# Patient Record
Sex: MALE | Race: WHITE | Employment: FULL TIME | URBAN - METROPOLITAN AREA
[De-identification: names, ages, dates, MRNs, and addresses within clinical notes are randomized per-mention and may not be internally consistent; named-entity substitution may affect disease eponyms.]

---

## 2017-03-09 ENCOUNTER — HOSPITAL ENCOUNTER (OUTPATIENT)
Dept: SURGERY | Age: 72
Discharge: HOME | End: 2017-03-09

## 2017-03-09 VITALS
HEIGHT: 76 IN | BODY MASS INDEX: 22.53 KG/M2 | TEMPERATURE: 98.6 F | HEART RATE: 64 BPM | DIASTOLIC BLOOD PRESSURE: 63 MMHG | WEIGHT: 185 LBS | OXYGEN SATURATION: 98 % | RESPIRATION RATE: 16 BRPM | SYSTOLIC BLOOD PRESSURE: 104 MMHG

## 2017-03-09 RX ORDER — ASPIRIN 81 MG/1
81 TABLET ORAL
COMMUNITY

## 2017-03-09 RX ORDER — MONTELUKAST SODIUM 10 MG/1
10 TABLET ORAL
COMMUNITY

## 2017-03-09 RX ORDER — CHOLECALCIFEROL (VITAMIN D3) 125 MCG
CAPSULE ORAL
COMMUNITY

## 2017-03-09 RX ORDER — SILODOSIN 4 MG/1
4 CAPSULE ORAL
COMMUNITY

## 2017-03-09 RX ORDER — LANOLIN ALCOHOL/MO/W.PET/CERES
1000 CREAM (GRAM) TOPICAL DAILY
COMMUNITY

## 2017-03-09 RX ORDER — SILDENAFIL 100 MG/1
100 TABLET, FILM COATED ORAL AS NEEDED
COMMUNITY

## 2017-03-09 RX ORDER — BISMUTH SUBSALICYLATE 262 MG
1 TABLET,CHEWABLE ORAL DAILY
COMMUNITY

## 2017-03-09 RX ORDER — GLUCOSAM/CHONDRO/HERB 149/HYAL 750-100 MG
TABLET ORAL 2 TIMES DAILY
COMMUNITY

## 2017-03-09 RX ORDER — ATORVASTATIN CALCIUM 40 MG/1
40 TABLET, FILM COATED ORAL
COMMUNITY

## 2017-03-09 RX ORDER — FINASTERIDE 5 MG/1
5 TABLET, FILM COATED ORAL
COMMUNITY

## 2017-03-09 RX ORDER — AZELASTINE HCL 205.5 UG/1
2 SPRAY NASAL
COMMUNITY

## 2017-03-09 NOTE — PERIOP NOTES
Patient verified name, , and surgery as listed in [facility]. TYPE  CASE:1B  Orders per surgeon: were Received  Labs per surgeon:none  Labs per anesthesia protocol: none  EKG  :  From 2017 on chart per protocol, patient has hx of paroxysmal A Fib which is controlled by Multaq, and asa 81 mg      Patient provided with handouts including guide to surgery , transfusions, pain management and hand hygiene for the family and community. Pt verbalizes understanding of all pre-op instructions . Instructed that family must be present in building at all times. Hibiclens and instructions given per hospital policy. Instructed patient to continue  previous medications as prescribed prior to surgery and  to take the following medications the day of surgery according to anesthesia guidelines : asa 81 mg, atorvastatin, multaq, finasteride       Original medication prescription bottles were not visualized during patient appointment. Continue all previous medications unless otherwise directed. Instructed patient to hold  the following medications prior to surgery: vitamins      Patient verbalized understanding of all instructions and provided all medical/health information to the best of their ability.

## 2017-03-14 ENCOUNTER — ANESTHESIA EVENT (OUTPATIENT)
Dept: SURGERY | Age: 72
End: 2017-03-14
Payer: COMMERCIAL

## 2017-03-14 RX ORDER — MIDAZOLAM HYDROCHLORIDE 1 MG/ML
2 INJECTION, SOLUTION INTRAMUSCULAR; INTRAVENOUS
Status: CANCELLED | OUTPATIENT
Start: 2017-03-14

## 2017-03-14 RX ORDER — SODIUM CHLORIDE 9 MG/ML
50 INJECTION, SOLUTION INTRAVENOUS CONTINUOUS
Status: CANCELLED | OUTPATIENT
Start: 2017-03-14 | End: 2017-03-15

## 2017-03-15 ENCOUNTER — ANESTHESIA (OUTPATIENT)
Dept: SURGERY | Age: 72
End: 2017-03-15
Payer: COMMERCIAL

## 2017-03-15 ENCOUNTER — SURGERY (OUTPATIENT)
Age: 72
End: 2017-03-15

## 2017-03-15 ENCOUNTER — HOSPITAL ENCOUNTER (OUTPATIENT)
Age: 72
Setting detail: OUTPATIENT SURGERY
Discharge: HOME | End: 2017-03-15
Attending: ORTHOPAEDIC SURGERY | Admitting: ORTHOPAEDIC SURGERY
Payer: COMMERCIAL

## 2017-03-15 VITALS
OXYGEN SATURATION: 99 % | SYSTOLIC BLOOD PRESSURE: 102 MMHG | HEART RATE: 64 BPM | DIASTOLIC BLOOD PRESSURE: 59 MMHG | WEIGHT: 183.5 LBS | TEMPERATURE: 97.4 F | RESPIRATION RATE: 16 BRPM | BODY MASS INDEX: 22.34 KG/M2

## 2017-03-15 PROCEDURE — 74011250637 HC RX REV CODE- 250/637: Performed by: ANESTHESIOLOGY

## 2017-03-15 PROCEDURE — 74011250636 HC RX REV CODE- 250/636: Performed by: ANESTHESIOLOGY

## 2017-03-15 PROCEDURE — 77030002933 HC SUT MCRYL J&J -A: Performed by: ORTHOPAEDIC SURGERY

## 2017-03-15 PROCEDURE — 74011250636 HC RX REV CODE- 250/636: Performed by: ORTHOPAEDIC SURGERY

## 2017-03-15 PROCEDURE — 77030003666 HC NDL SPINAL BD -A: Performed by: ORTHOPAEDIC SURGERY

## 2017-03-15 PROCEDURE — 77030003602 HC NDL NRV BLK BBMI -B: Performed by: ANESTHESIOLOGY

## 2017-03-15 PROCEDURE — 76060000033 HC ANESTHESIA 1 TO 1.5 HR: Performed by: ORTHOPAEDIC SURGERY

## 2017-03-15 PROCEDURE — 77030006668 HC BLD SHV MENSCS STRY -B: Performed by: ORTHOPAEDIC SURGERY

## 2017-03-15 PROCEDURE — C1713 ANCHOR/SCREW BN/BN,TIS/BN: HCPCS | Performed by: ORTHOPAEDIC SURGERY

## 2017-03-15 PROCEDURE — 77030033005 HC TBNG ARTHSC PMP STRY -B: Performed by: ORTHOPAEDIC SURGERY

## 2017-03-15 PROCEDURE — 77030010430: Performed by: ORTHOPAEDIC SURGERY

## 2017-03-15 PROCEDURE — 74011000250 HC RX REV CODE- 250

## 2017-03-15 PROCEDURE — 76010010054 HC POST OP PAIN BLOCK: Performed by: ORTHOPAEDIC SURGERY

## 2017-03-15 PROCEDURE — 77030033073 HC TBNG ARTHSC PMP OUTFLO STRY -B: Performed by: ORTHOPAEDIC SURGERY

## 2017-03-15 PROCEDURE — 76210000026 HC REC RM PH II 1 TO 1.5 HR: Performed by: ORTHOPAEDIC SURGERY

## 2017-03-15 PROCEDURE — 77030004453 HC BUR SHV STRY -B: Performed by: ORTHOPAEDIC SURGERY

## 2017-03-15 PROCEDURE — 76210000063 HC OR PH I REC FIRST 0.5 HR: Performed by: ORTHOPAEDIC SURGERY

## 2017-03-15 PROCEDURE — 77030019605: Performed by: ORTHOPAEDIC SURGERY

## 2017-03-15 PROCEDURE — 76010000161 HC OR TIME 1 TO 1.5 HR INTENSV-TIER 1: Performed by: ORTHOPAEDIC SURGERY

## 2017-03-15 PROCEDURE — 77030020052 HC SUT PASS DISP S&N -B: Performed by: ORTHOPAEDIC SURGERY

## 2017-03-15 PROCEDURE — 76942 ECHO GUIDE FOR BIOPSY: CPT | Performed by: ORTHOPAEDIC SURGERY

## 2017-03-15 PROCEDURE — 77030018673: Performed by: ORTHOPAEDIC SURGERY

## 2017-03-15 PROCEDURE — 77030027384 HC PRB ARTHSCP SERFAS STRY -C: Performed by: ORTHOPAEDIC SURGERY

## 2017-03-15 PROCEDURE — 77030034139 HC NDL ENDOSC TRUPASS SGL S&N -C: Performed by: ORTHOPAEDIC SURGERY

## 2017-03-15 PROCEDURE — 77030018836 HC SOL IRR NACL ICUM -A: Performed by: ORTHOPAEDIC SURGERY

## 2017-03-15 PROCEDURE — 74011250636 HC RX REV CODE- 250/636

## 2017-03-15 PROCEDURE — 77030020143 HC AIRWY LARYN INTUB CGAS -A: Performed by: ANESTHESIOLOGY

## 2017-03-15 PROCEDURE — 77030032490 HC SLV COMPR SCD KNE COVD -B: Performed by: ORTHOPAEDIC SURGERY

## 2017-03-15 DEVICE — FOOTPRINT ULTRA PK SUTURE ANCHOR 4.5
Type: IMPLANTABLE DEVICE | Site: SHOULDER | Status: FUNCTIONAL
Brand: FOOTPRINT

## 2017-03-15 RX ORDER — MIDAZOLAM HYDROCHLORIDE 1 MG/ML
2 INJECTION, SOLUTION INTRAMUSCULAR; INTRAVENOUS ONCE
Status: COMPLETED | OUTPATIENT
Start: 2017-03-15 | End: 2017-03-15

## 2017-03-15 RX ORDER — SODIUM CHLORIDE 0.9 % (FLUSH) 0.9 %
5-10 SYRINGE (ML) INJECTION EVERY 8 HOURS
Status: DISCONTINUED | OUTPATIENT
Start: 2017-03-15 | End: 2017-03-15 | Stop reason: HOSPADM

## 2017-03-15 RX ORDER — OXYCODONE HYDROCHLORIDE 5 MG/1
5 TABLET ORAL
Status: DISCONTINUED | OUTPATIENT
Start: 2017-03-15 | End: 2017-03-15 | Stop reason: HOSPADM

## 2017-03-15 RX ORDER — LIDOCAINE HYDROCHLORIDE 10 MG/ML
0.1 INJECTION INFILTRATION; PERINEURAL AS NEEDED
Status: DISCONTINUED | OUTPATIENT
Start: 2017-03-15 | End: 2017-03-15 | Stop reason: HOSPADM

## 2017-03-15 RX ORDER — DIPHENHYDRAMINE HYDROCHLORIDE 50 MG/ML
12.5 INJECTION, SOLUTION INTRAMUSCULAR; INTRAVENOUS ONCE
Status: DISCONTINUED | OUTPATIENT
Start: 2017-03-15 | End: 2017-03-15 | Stop reason: HOSPADM

## 2017-03-15 RX ORDER — CEFAZOLIN SODIUM IN 0.9 % NACL 2 G/50 ML
2 INTRAVENOUS SOLUTION, PIGGYBACK (ML) INTRAVENOUS ONCE
Status: COMPLETED | OUTPATIENT
Start: 2017-03-15 | End: 2017-03-15

## 2017-03-15 RX ORDER — PROPOFOL 10 MG/ML
INJECTION, EMULSION INTRAVENOUS AS NEEDED
Status: DISCONTINUED | OUTPATIENT
Start: 2017-03-15 | End: 2017-03-15 | Stop reason: HOSPADM

## 2017-03-15 RX ORDER — EPINEPHRINE 1 MG/ML
INJECTION, SOLUTION, CONCENTRATE INTRAVENOUS AS NEEDED
Status: DISCONTINUED | OUTPATIENT
Start: 2017-03-15 | End: 2017-03-15 | Stop reason: HOSPADM

## 2017-03-15 RX ORDER — HYDROMORPHONE HYDROCHLORIDE 2 MG/ML
0.5 INJECTION, SOLUTION INTRAMUSCULAR; INTRAVENOUS; SUBCUTANEOUS
Status: DISCONTINUED | OUTPATIENT
Start: 2017-03-15 | End: 2017-03-15 | Stop reason: HOSPADM

## 2017-03-15 RX ORDER — SODIUM CHLORIDE, SODIUM LACTATE, POTASSIUM CHLORIDE, CALCIUM CHLORIDE 600; 310; 30; 20 MG/100ML; MG/100ML; MG/100ML; MG/100ML
100 INJECTION, SOLUTION INTRAVENOUS CONTINUOUS
Status: DISCONTINUED | OUTPATIENT
Start: 2017-03-15 | End: 2017-03-15 | Stop reason: HOSPADM

## 2017-03-15 RX ORDER — FENTANYL CITRATE 50 UG/ML
25 INJECTION, SOLUTION INTRAMUSCULAR; INTRAVENOUS ONCE
Status: COMPLETED | OUTPATIENT
Start: 2017-03-15 | End: 2017-03-15

## 2017-03-15 RX ORDER — SODIUM CHLORIDE 0.9 % (FLUSH) 0.9 %
5-10 SYRINGE (ML) INJECTION AS NEEDED
Status: DISCONTINUED | OUTPATIENT
Start: 2017-03-15 | End: 2017-03-15 | Stop reason: HOSPADM

## 2017-03-15 RX ORDER — ROPIVACAINE HYDROCHLORIDE 5 MG/ML
INJECTION, SOLUTION EPIDURAL; INFILTRATION; PERINEURAL AS NEEDED
Status: DISCONTINUED | OUTPATIENT
Start: 2017-03-15 | End: 2017-03-15 | Stop reason: HOSPADM

## 2017-03-15 RX ORDER — FAMOTIDINE 20 MG/1
20 TABLET, FILM COATED ORAL ONCE
Status: COMPLETED | OUTPATIENT
Start: 2017-03-15 | End: 2017-03-15

## 2017-03-15 RX ORDER — DEXAMETHASONE SODIUM PHOSPHATE 4 MG/ML
INJECTION, SOLUTION INTRA-ARTICULAR; INTRALESIONAL; INTRAMUSCULAR; INTRAVENOUS; SOFT TISSUE AS NEEDED
Status: DISCONTINUED | OUTPATIENT
Start: 2017-03-15 | End: 2017-03-15 | Stop reason: HOSPADM

## 2017-03-15 RX ORDER — LIDOCAINE HYDROCHLORIDE 20 MG/ML
INJECTION, SOLUTION EPIDURAL; INFILTRATION; INTRACAUDAL; PERINEURAL AS NEEDED
Status: DISCONTINUED | OUTPATIENT
Start: 2017-03-15 | End: 2017-03-15 | Stop reason: HOSPADM

## 2017-03-15 RX ORDER — OXYCODONE AND ACETAMINOPHEN 5; 325 MG/1; MG/1
1 TABLET ORAL AS NEEDED
Status: DISCONTINUED | OUTPATIENT
Start: 2017-03-15 | End: 2017-03-15 | Stop reason: HOSPADM

## 2017-03-15 RX ADMIN — SODIUM CHLORIDE, SODIUM LACTATE, POTASSIUM CHLORIDE, AND CALCIUM CHLORIDE: 600; 310; 30; 20 INJECTION, SOLUTION INTRAVENOUS at 10:35

## 2017-03-15 RX ADMIN — CEFAZOLIN 2 G: 1 INJECTION, POWDER, FOR SOLUTION INTRAMUSCULAR; INTRAVENOUS; PARENTERAL at 10:02

## 2017-03-15 RX ADMIN — FENTANYL CITRATE 25 MCG: 50 INJECTION, SOLUTION INTRAMUSCULAR; INTRAVENOUS at 09:23

## 2017-03-15 RX ADMIN — LIDOCAINE HYDROCHLORIDE 100 MG: 20 INJECTION, SOLUTION EPIDURAL; INFILTRATION; INTRACAUDAL; PERINEURAL at 10:04

## 2017-03-15 RX ADMIN — FAMOTIDINE 20 MG: 20 TABLET ORAL at 09:14

## 2017-03-15 RX ADMIN — SODIUM CHLORIDE, SODIUM LACTATE, POTASSIUM CHLORIDE, AND CALCIUM CHLORIDE 100 ML/HR: 600; 310; 30; 20 INJECTION, SOLUTION INTRAVENOUS at 09:15

## 2017-03-15 RX ADMIN — PROPOFOL 30 MG: 10 INJECTION, EMULSION INTRAVENOUS at 09:23

## 2017-03-15 RX ADMIN — MIDAZOLAM HYDROCHLORIDE 2 MG: 1 INJECTION, SOLUTION INTRAMUSCULAR; INTRAVENOUS at 09:23

## 2017-03-15 RX ADMIN — PROPOFOL 200 MG: 10 INJECTION, EMULSION INTRAVENOUS at 10:04

## 2017-03-15 RX ADMIN — DEXAMETHASONE SODIUM PHOSPHATE 4 MG: 4 INJECTION, SOLUTION INTRA-ARTICULAR; INTRALESIONAL; INTRAMUSCULAR; INTRAVENOUS; SOFT TISSUE at 10:15

## 2017-03-15 RX ADMIN — ROPIVACAINE HYDROCHLORIDE 30 ML: 5 INJECTION, SOLUTION EPIDURAL; INFILTRATION; PERINEURAL at 09:27

## 2017-03-15 RX ADMIN — EPINEPHRINE 1 MG: 1 INJECTION, SOLUTION INTRAMUSCULAR; SUBCUTANEOUS at 10:35

## 2017-03-15 NOTE — BRIEF OP NOTE
BRIEF OPERATIVE NOTE    Date of Procedure: 3/15/2017   Preoperative Diagnosis: Tear of left rotator cuff, unspecified tear extent [M75.102]  Postoperative Diagnosis: Left shoulder rotator cuff tear/impingement/AC arthritis    Procedure(s):  LEFT SHOULDER ARTHROSCOPY ROTATOR CUFF REPAIR/ DECOMPRESSION/ DISTAL CLAVICLE EXCISION  Surgeon(s) and Role: Davon Lopez MD - Primary       Physician Assistant: EDILMA Rose    Surgical Staff:  Circ-1: Otis Laguerre RN  Physician Assistant: EDILMA Rose  Scrub Tech-1: Willy Davidson  Scrub Tech-2: Anshul Mayesretta  Event Time In   Incision Start 1023   Incision Close 1101     Anesthesia: General   Estimated Blood Loss: min  Specimens: * No specimens in log *   Findings: RTC   Complications: none  Implants:   Implant Name Type Inv.  Item Serial No.  Lot No. LRB No. Used Action   ANCHOR FOOTPRINT SUT 4.5MM --  - [id number]   ANCHOR FOOTPRINT SUT 4.5MM --    [facility] AND [facility] ENDOSCOPY [id number] 1 Implanted

## 2017-03-15 NOTE — DISCHARGE INSTRUCTIONS
. Post-Operative Instructions   For  Shoulder Arthroscopy Rotator Cuff Repair  Phone:  [phone]    1. If you do not have an \"Iceman\" type cooling unit, for the first 48-72 hours following surgery, use ice on the shoulder every two hours (while awake) for 20-30 minutes at a time to help prevent swelling and lessen pain. If you have a cooling unit, follow the instructions given to you- continually as much as possible the first 48-72 hours, then 3-4 times a day for 4 weeks. 2. You may shower after the arthroscopy. Keep dressings in place for the first three days. After three days, you may remove the dressings and leave the steri-strip bandages in place; they will peel off naturally. 3. Stay in sling at all times except to shower. Do not use shoulder muscles by raising arm toward head or away from side of body at any time. 4. Begin therapy as ordered. The therapists will call you to schedule     5. Use any pain medication as instructed. You should take your pain medication as soon as you feel the anesthetic wearing off. Do not wait until you are in severe pain to begin taking your pain medication. 6. You may have some side effects from your pain medication. If you have nausea, try taking your medication with food. For itching, you may take over the counter Benadryl. 7. You may have been given a prescription for Zofran or Phenergan. This medication is used for nausea and vomiting. You do not need to get this prescription filled unless you have a problem. 8. If you have a problem, please call [address] [place] [address] at [phone]    [address] [place] [address], P.A[facility]   ·     DIET  · Clear liquids until no nausea or vomiting; then light diet for the first day. · Advance to regular diet on second day, unless your doctor orders otherwise. · If nausea and vomiting continues, call your doctor.    · Avoid greasy and spicy food today to reduce nausea. PAIN  · Take pain medication as directed by your doctor. · Call your doctor if pain is NOT relieved by medication. · DO NOT take aspirin of blood thinners unless directed by your doctor. · Take pain pills with food to reduce nausea  · Pain pills may cause constipation. May use stool softener  · Begin taking pain pills when sensation nreturns or at bedtime even if still numb          CALL YOUR DOCTOR IF   · Excessive bleeding that does not stop after holding pressure over the area  · Temperature of 101 degrees F or above  · Excessive redness, swelling or bruising, and/ or green or yellow, smelly discharge from incision    AFTER ANESTHESIA   · For the first 24 hours: DO NOT Drive, Drink alcoholic beverages, or Make important decisions. · Be aware of dizziness following anesthesia and while taking pain medication. APPOINTMENT DATE/ TIME: The office will call you to schedule a 2 week follow up. YOUR DOCTOR'S PHONE NUMBER [phone]      DISCHARGE SUMMARY from Nurse    PATIENT INSTRUCTIONS:    After general anesthesia or intravenous sedation, for 24 hours or while taking prescription Narcotics:  · Limit your activities  · Do not drive and operate hazardous machinery  · Do not make important personal or business decisions  · Do  not drink alcoholic beverages  · If you have not urinated within 8 hours after discharge, please contact your surgeon on call. *  Please give a list of your current medications to your Primary Care Provider. *  Please update this list whenever your medications are discontinued, doses are      changed, or new medications (including over-the-counter products) are added. *  Please carry medication information at all times in case of emergency situations.       These are general instructions for a healthy lifestyle:    No smoking/ No tobacco products/ Avoid exposure to second hand smoke    Surgeon General's Warning:  Quitting smoking now greatly reduces serious risk to your health. Obesity, smoking, and sedentary lifestyle greatly increases your risk for illness    A healthy diet, regular physical exercise & weight monitoring are important for maintaining a healthy lifestyle    You may be retaining fluid if you have a history of heart failure or if you experience any of the following symptoms:  Weight gain of 3 pounds or more overnight or 5 pounds in a week, increased swelling in our hands or feet or shortness of breath while lying flat in bed. Please call your doctor as soon as you notice any of these symptoms; do not wait until your next office visit.     Recognize signs and symptoms of STROKE:    F-face looks uneven    A-arms unable to move or move unevenly    S-speech slurred or non-existent    T-time-call 911 as soon as signs and symptoms begin-DO NOT go       Back to bed or wait to see if you get better-TIME IS BRAIN

## 2017-03-15 NOTE — IP AVS SNAPSHOT
Tessa Melton 
 
 
 [address] 
[phone] Patient: Lauralyn Schwab MRN: [id number] AYZ:[birth year] You are allergic to the following No active allergies Recent Documentation Weight BMI Smoking Status 83.2 kg 22.34 kg/m2 Never Smoker Emergency Contacts Name Discharge Info Relation Home Work Mobile Corazon Christie DISCHARGE CAREGIVER [3] Spouse [3] [phone] About your hospitalization You were admitted on:  March 15, 2017 You last received care in the:  [facility] OP PACU You were discharged on:  March 15, 2017 Unit phone number:  [phone] Why you were hospitalized Your primary diagnosis was:  Not on File Providers Seen During Your Hospitalizations Provider Role Specialty Primary office phone Maggy Sweeney MD Attending Provider Orthopedic Surgery [phone] Your Primary Care Physician (PCP) Primary Care Physician Office Phone Office Fax Rivka Crespo [phone] Follow-up Information None Your Appointments Wednesday March 22, 2017  1:00 PM EDT  
[facility] INITIAL ORTHO with David Gao, PT  
[facility] ([address] [place] [address]) [address] [place] [id number] [phone] Current Discharge Medication List  
  
ASK your doctor about these medications Dose & Instructions Dispensing Information Comments Morning Noon Evening Bedtime  
 aspirin delayed-release 81 mg tablet Your last dose was: Your next dose is:    
   
   
 Dose:  81 mg Take 81 mg by mouth every morning. Refills:  0 Azelastine 0.15 % (205.5 mcg) nasal spray Commonly known as:  ASTEPRO Your last dose was: Your next dose is:    
   
   
 Dose:  2 Spray 2 Sprays by Both Nostrils route DIALYSIS PRN. Refills:  0  
     
   
   
   
  
 cyanocobalamin 1,000 mcg tablet Your last dose was: Your next dose is:    
   
   
 Dose:  1000 mcg Take 1,000 mcg by mouth daily. Refills:  0  
     
   
   
   
  
 finasteride 5 mg tablet Commonly known as:  PROSCAR Your last dose was: Your next dose is:    
   
   
 Dose:  5 mg Take 5 mg by mouth every morning. Refills:  0  
     
   
   
   
  
 FISH OIL 1,000 mg (120 mg-180 mg) Cap Generic drug:  Omega-3-DHA-EPA-Fish Oil Your last dose was: Your next dose is: Take  by mouth two (2) times a day. Holding for suregey Refills:  0 LIPITOR 40 mg tablet Generic drug:  atorvastatin Your last dose was: Your next dose is:    
   
   
 Dose:  40 mg Take 40 mg by mouth every morning. Refills:  0 MULTAQ Tab tablet Generic drug:  dronedarone Your last dose was: Your next dose is:    
   
   
 Dose:  400 mg Take 400 mg by mouth two (2) times daily (with meals). Indications: PAROXYSMAL ATRIAL FIBRILLATION Refills:  0  
     
   
   
   
  
 multivitamin tablet Commonly known as:  ONE A DAY Your last dose was: Your next dose is:    
   
   
 Dose:  1 Tab Take 1 Tab by mouth daily. Holding till surgery Refills:  0  
     
   
   
   
  
 RAPAFLO 4 mg capsule Generic drug:  silodosin Your last dose was: Your next dose is:    
   
   
 Dose:  4 mg Take 4 mg by mouth nightly. Refills:  0 SINGULAIR 10 mg tablet Generic drug:  montelukast  
   
Your last dose was: Your next dose is:    
   
   
 Dose:  10 mg Take 10 mg by mouth nightly. Refills:  0 VIAGRA 100 mg tablet Generic drug:  sildenafil citrate Your last dose was: Your next dose is:    
   
   
 Dose:  100 mg Take 100 mg by mouth as needed. Refills:  0 VITAMIN D3 2,000 unit Tab Generic drug:  cholecalciferol (vitamin D3) Your last dose was: Your next dose is: Take  by mouth. Holding for surgery Refills:  0 Discharge Instructions Enrique Aponte Post-Operative Instructions For Shoulder Arthroscopy Rotator Cuff Repair Phone:  [phone] 1. If you do not have an \"Iceman\" type cooling unit, for the first 48-72 hours following surgery, use ice on the shoulder every two hours (while awake) for 20-30 minutes at a time to help prevent swelling and lessen pain. If you have a cooling unit, follow the instructions given to you- continually as much as possible the first 48-72 hours, then 3-4 times a day for 4 weeks. 2. You may shower after the arthroscopy. Keep dressings in place for the first three days. After three days, you may remove the dressings and leave the steri-strip bandages in place; they will peel off naturally. 3. Stay in sling at all times except to shower. Do not use shoulder muscles by raising arm toward head or away from side of body at any time. 4. Begin therapy as ordered. The therapists will call you to schedule 5. Use any pain medication as instructed. You should take your pain medication as soon as you feel the anesthetic wearing off. Do not wait until you are in severe pain to begin taking your pain medication. 6. You may have some side effects from your pain medication. If you have nausea, try taking your medication with food. For itching, you may take over the counter Benadryl. 7. You may have been given a prescription for Zofran or Phenergan. This medication is used for nausea and vomiting. You do not need to get this prescription filled unless you have a problem. 8. If you have a problem, please call [address] [place] [address] at [phone] [employer]  
·  
 
DIET · Clear liquids until no nausea or vomiting; then light diet for the first day. · Advance to regular diet on second day, unless your doctor orders otherwise. · If nausea and vomiting continues, call your doctor. · Avoid greasy and spicy food today to reduce nausea. PAIN 
· Take pain medication as directed by your doctor. · Call your doctor if pain is NOT relieved by medication. · DO NOT take aspirin of blood thinners unless directed by your doctor. · Take pain pills with food to reduce nausea · Pain pills may cause constipation. May use stool softener · Begin taking pain pills when sensation nreturns or at bedtime even if still numb CALL YOUR DOCTOR IF  
· Excessive bleeding that does not stop after holding pressure over the area · Temperature of 101 degrees F or above · Excessive redness, swelling or bruising, and/ or green or yellow, smelly discharge from incision AFTER ANESTHESIA · For the first 24 hours: DO NOT Drive, Drink alcoholic beverages, or Make important decisions. · Be aware of dizziness following anesthesia and while taking pain medication. APPOINTMENT DATE/ TIME: The office will call you to schedule a 2 week follow up. YOUR DOCTOR'S PHONE NUMBER [phone] DISCHARGE SUMMARY from Nurse PATIENT INSTRUCTIONS: 
 
After general anesthesia or intravenous sedation, for 24 hours or while taking prescription Narcotics: · Limit your activities · Do not drive and operate hazardous machinery · Do not make important personal or business decisions · Do  not drink alcoholic beverages · If you have not urinated within 8 hours after discharge, please contact your surgeon on call. *  Please give a list of your current medications to your Primary Care Provider. *  Please update this list whenever your medications are discontinued, doses are 
    changed, or new medications (including over-the-counter products) are added. *  Please carry medication information at all times in case of emergency situations. These are general instructions for a healthy lifestyle: No smoking/ No tobacco products/ Avoid exposure to second hand smoke Surgeon General's Warning:  Quitting smoking now greatly reduces serious risk to your health. Obesity, smoking, and sedentary lifestyle greatly increases your risk for illness A healthy diet, regular physical exercise & weight monitoring are important for maintaining a healthy lifestyle You may be retaining fluid if you have a history of heart failure or if you experience any of the following symptoms:  Weight gain of 3 pounds or more overnight or 5 pounds in a week, increased swelling in our hands or feet or shortness of breath while lying flat in bed. Please call your doctor as soon as you notice any of these symptoms; do not wait until your next office visit. Recognize signs and symptoms of STROKE: 
 
F-face looks uneven A-arms unable to move or move unevenly S-speech slurred or non-existent T-time-call 911 as soon as signs and symptoms begin-DO NOT go Back to bed or wait to see if you get better-TIME IS BRAIN Discharge Orders None Introducing [facility]! [facility] introduces [employer] patient portal. Now you can access parts of your medical record, email your doctor's office, and request medication refills online. 1. In your internet browser, go to [email or web address] 2. Click on the First Time User? Click Here link in the Sign In box. You will see the New Member Sign Up page. 3. Enter your [employer] Access Code exactly as it appears below. You will not need to use this code after youve completed the sign-up process. If you do not sign up before the expiration date, you must request a new code. · [employer] Access Code: [email or web address] Expires: 6/4/2017  3:51 PM 
 
 4. Enter the last four digits of your Social Security Number (xxxx) and Date of Birth (mm/dd/yyyy) as indicated and click Submit. You will be taken to the next sign-up page. 5. Create a [email or web address] ID. This will be your [email or web address] login ID and cannot be changed, so think of one that is secure and easy to remember. 6. Create a [email or web address] password. You can change your password at any time. 7. Enter your Password Reset Question and Answer. This can be used at a later time if you forget your password. 8. Enter your e-mail address. You will receive e-mail notification when new information is available in [address] 9. Click Sign Up. You can now view and download portions of your medical record. 10. Click the Download Summary menu link to download a portable copy of your medical information. If you have questions, please visit the Frequently Asked Questions section of the [email or web address] website. Remember, [email or web address] is NOT to be used for urgent needs. For medical emergencies, dial 911. Now available from your iPhone and Android! General Information Please provide this summary of care documentation to your next provider. Patient Signature:  ____________________________________________________________ Date:  ____________________________________________________________  
  
[employer] Provider Signature:  ____________________________________________________________ Date:  ____________________________________________________________

## 2017-03-15 NOTE — H&P
Outpatient Surgery History and Physical:  Jose Alvarez was seen and examined. CHIEF COMPLAINT:   Left shoulder pain. PE:     Visit Vitals    /71 (BP 1 Location: Right arm, BP Patient Position: At rest)    Pulse 65    Temp 97.9 °F (36.6 °C)    Resp 16    Wt 83.2 kg (183 lb 8 oz)    SpO2 99%    BMI 22.34 kg/m2       Heart:   Regular rhythm      Lungs:  Are clear      Past Medical History: There are no active problems to display for this patient. Surgical History:   Past Surgical History:   Procedure Laterality Date    HX CERVICAL DISKECTOMY  05/2000    HX COLONOSCOPY      HX UROLOGICAL  1979    vericocele ligation    HX VASECTOMY  1970's       Social History: Patient  reports that he has never smoked. He has never used smokeless tobacco. He reports that he drinks about 4.2 oz of alcohol per week  He reports that he does not use illicit drugs. Family History:   Family History   Problem Relation Age of Onset    Hypertension Mother     Hypertension Father        Allergies: Reviewed per EMR  No Known Allergies    Medications:    No current facility-administered medications on file prior to encounter. No current outpatient prescriptions on file prior to encounter. The surgery is planned for the left shoulder. History and physical has been reviewed. The patient has been examined. There have been no significant clinical changes since the completion of the originally dated History and Physical.  Patient identified by surgeon; surgical site was confirmed by patient and surgeon. The patient is here today for outpatient surgery. I have examined the patient, no changes are noted in the patient's medical status. Necessity for the procedure/care is still present and the history and physical above is current. See the office notes for the full long term history of the problem. Please see the recent office notes for the musculoskeletal examination.     Signed By: Tami Yanes Ty [place], [place]     March 15, 2017 1318

## 2017-03-15 NOTE — ANESTHESIA PROCEDURE NOTES
Peripheral Block    Start time: 3/15/2017 9:23 AM  End time: 3/15/2017 9:27 AM  Performed by: Nilda Alfaro  Authorized by: Nilda Alfaro       Pre-procedure: Indications: at surgeon's request and post-op pain management    Preanesthetic Checklist: patient identified, risks and benefits discussed, site marked, timeout performed, anesthesia consent given and patient being monitored    Timeout Time: 09:23          Block Type:   Block Type:   Interscalene  Laterality:  Left  Monitoring:  Standard ASA monitoring, continuous pulse ox, frequent vital sign checks, heart rate and oxygen  Injection Technique:  Single shot  Procedures: ultrasound guided and nerve stimulator    Prep: chlorhexidine    Location:  Interscalene  Needle Type:  Stimuplex  Needle Gauge:  22 G  Needle Localization:  Nerve stimulator and ultrasound guidance  Motor Response: minimal motor response >0.4 mA    Volume (mL):  40  Add'l Medication Injected:  0.5%  ropivacaine  Volume (mL):  10    Assessment:  Number of attempts:  1  Injection Assessment:  Local visualized surrounding nerve on ultrasound, negative aspiration for CSF, negative aspiration for blood, no paresthesia, no intravascular symptoms and ultrasound image on chart  Patient tolerance:  Patient tolerated the procedure well with no immediate complications  30

## 2017-03-15 NOTE — OP NOTES
[facility] 64   OPERATIVE REPORT       Name:  Chayo Cano   MR#:  [id number]   :  1945   Account #:  [de-identified]   Date of Adm:  03/15/2017       DATE OF SURGERY: 03/15/2017     PREOPERATIVE DIAGNOSES:   1. Rotator cuff tear. 2. Acromioclavicular arthritis. 3. Labral tear. 4. Impingement, left shoulder. POSTOPERATIVE DIAGNOSES:   1. Rotator cuff tear. 2. Acromioclavicular arthritis. 3. Labral tear. 4. Impingement, left shoulder. OPERATION PERFORMED:   1. Arthroscopic rotator cuff repair. 2. Arthroscopic distal clavicle excision (Benoit procedure). 3. Arthroscopic labral debridement. 4. Arthroscopic subacromial decompression, left shoulder. SURGEON: Barbie Dumont MD    ASSISTANT: EDILMA Castro    ANESTHESIA: General.    FLUIDS: Crystalloid. ESTIMATED BLOOD LOSS: Minimal.    FINDINGS: There was a tear of the rotator cuff. This involved   the supraspinatus. There was some mild partial tearing of   the anterior aspect of the infraspinatus. There was some tearing   of the upper border of the subscap in a small area, but the   majority of the subscap was intact. The biceps showed no   tearing. There was some superior, posterior, and inferior labral   tearing. There was no significant glenohumeral arthritis. There   was an anterior/inferior acromial slope. There was undersurface   osteophyte formation and degenerative change of the distal   clavicle at the [facility] joint. DESCRIPTION OF PROCEDURE: After informed consent, the patient   was brought to the operating room and placed on the table in   supine position. General endotracheal anesthesia was   administered without difficulty. The patient was placed in the   right lateral decubitus position. All pressure points were   suspended and padded appropriately. Left upper extremity   suspended in overhead traction. Left shoulder was prepped and   draped in sterile fashion.  Glenohumeral joint was insufflated   with 50 mL of sterile saline and a posterior portal was   established. Glenohumeral joint was then arthroscoped in   sequential manner. The aforementioned findings were noted. An   anterior portal was established. Labral tearing was debrided   smooth. All loose flaps of tissue were removed. There were no   sharp edges or unstable fragments after the labral debridement. Undersurface rotator cuff tearing was debrided back to a smooth,   stable area. The scope was brought in the subacromial space. A   lateral portal was established. Bursal proliferative tissue was   excised. The undersurface of the acromion was exposed and an   acromioplasty was performed. All of the acromion anterior   leading edge of distal clavicle was excised to a depth of 5 to 6   mm and a 2 cm anterior to posterior direction was removed. This   opened up the subacromial space nicely. Attention was then   directed to the distal clavicle. Through both the anterior and   lateral portals, a circumferential distal clavicle excision was   performed. Approximately 10 mm of distal clavicle was excised. Circumferential excision was verified arthroscopically. The   Benoit procedure was performed without difficulty. Attention   was then directed to the rotator cuff. The tear was easily   mobilized back to its original insertion. The area underneath   the original insertion was lightly decorticated. A footprint   anchor and 2 Ultratape sutures in a mattress fashion were used   to completely repair the tear. Anatomic rotator cuff repair was   performed. The rotator cuff tear was repaired without   difficulty. Shoulder was thoroughly irrigated, portals closed. Sterile dressings were applied. The patient was extubated and   taken to the recovery room in stable condition. EDILMA Ott, assisted during the procedure.  He was necessary   for the patient positioning, wound closure and assistance with   the major portions of procedure including the rotator cuff   repairs. His presence decreased the operative time and potential   complication rate.         Kyle Fierro MD      TETAMMY / CD   D:  03/15/2017   11:02   T:  03/15/2017   11:17   Job #:  [id number]

## 2017-03-15 NOTE — ANESTHESIA PREPROCEDURE EVALUATION
Anesthetic History               Review of Systems / Medical History  Patient summary reviewed and pertinent labs reviewed    Pulmonary  Within defined limits                 Neuro/Psych   Within defined limits           Cardiovascular            Dysrhythmias : atrial fibrillation  Hyperlipidemia    Exercise tolerance: >4 METS  Comments: bASA  Fish oil  Unable to tolerate other blood thinners   GI/Hepatic/Renal  Within defined limits              Endo/Other  Within defined limits           Other Findings              Physical Exam    Airway  Mallampati: II  TM Distance: 4 - 6 cm    Mouth opening: Normal     Cardiovascular  Regular rate and rhythm,  S1 and S2 normal,  no murmur, click, rub, or gallop  Rhythm: regular  Rate: normal         Dental    Dentition: Bridges and Caps/crowns  Comments: permenant   Pulmonary  Breath sounds clear to auscultation               Abdominal  GI exam deferred       Other Findings            Anesthetic Plan    ASA: 2  Anesthesia type: general      Post-op pain plan if not by surgeon: peripheral nerve block single    Induction: Intravenous  Anesthetic plan and risks discussed with: Patient

## 2017-03-16 NOTE — ANESTHESIA POSTPROCEDURE EVALUATION
Post-Anesthesia Evaluation and Assessment    Patient: Meenu Swarzt MRN: [id number]  SSN: [id number]    YOB: 1945  Age: 67 y.o. Sex: male       Cardiovascular Function/Vital Signs  Visit Vitals    /59 (BP 1 Location: Right arm, BP Patient Position: At rest)    Pulse 64    Temp 36.3 °C (97.4 °F)    Resp 16    Wt 83.2 kg (183 lb 8 oz)    SpO2 99%    BMI 22.34 kg/m2       Patient is status post general anesthesia for Procedure(s):  LEFT SHOULDER ARTHROSCOPY ROTATOR CUFF REPAIR/ DECOMPRESSION/ DISTAL CLAVICLE EXCISION. Nausea/Vomiting: None    Postoperative hydration reviewed and adequate. Pain:  Pain Scale 1: Numeric (0 - 10) (03/15/17 1206)  Pain Intensity 1: 0 (03/15/17 1206)   Managed    Neurological Status:   Neuro (WDL): Exceptions to WDL (03/15/17 1206)  Neuro  Neurologic State: Alert; Appropriate for age (03/15/17 1206)  LUE Motor Response: Floppy (03/15/17 1206)   At baseline    Mental Status and Level of Consciousness: Arousable    Pulmonary Status:   O2 Device: Room air (03/15/17 1206)   Adequate oxygenation and airway patent    Complications related to anesthesia: None    Post-anesthesia assessment completed.  No concerns    Signed By: Charlie Quinn MD     March 16, 2017

## 2017-03-22 ENCOUNTER — HOSPITAL ENCOUNTER (OUTPATIENT)
Dept: PHYSICAL THERAPY | Age: 72
Discharge: HOME | End: 2017-03-22
Payer: COMMERCIAL

## 2017-03-22 PROCEDURE — 97140 MANUAL THERAPY 1/> REGIONS: CPT

## 2017-03-22 PROCEDURE — 97110 THERAPEUTIC EXERCISES: CPT

## 2017-03-22 PROCEDURE — 97162 PT EVAL MOD COMPLEX 30 MIN: CPT

## 2017-03-22 NOTE — PROGRESS NOTES
Ambulatory/Rehab Services H2 Model Falls Risk Assessment    Risk Factor Pts. ·   Confusion/Disorientation/Impulsivity  []    4 ·   Symptomatic Depression  []   2 ·   Altered Elimination  []   1 ·   Dizziness/Vertigo  []   1 ·   Gender (Male)  [x]   1 ·   Any administered antiepileptics (anticonvulsants):  []   2 ·   Any administered benzodiazepines:  []   1 ·   Visual Impairment (specify):  []   1 ·   Portable Oxygen Use  []   1 ·   Orthostatic ? BP  []   1 ·   History of Recent Falls (within 3 mos.)  []   5     Ability to Rise from Chair (choose one) Pts. ·   Ability to rise in a single movement  [x]   0 ·   Pushes up, successful in one attempt  []   1 ·   Multiple attempts, but successful  []   3 ·   Unable to rise without assistance  []   4   Total: (5 or greater = High Risk) 1     Falls Prevention Plan:   []                Physical Limitations to Exercise (specify):   []                Mobility Assistance Device (type):   []                Exercise/Equipment Adaptation (specify):    ©2010 [facility] of [address] Patent #[id number].  Federal Law prohibits the replication, distribution or use without written permission from [facility]

## 2017-03-23 ENCOUNTER — HOSPITAL ENCOUNTER (OUTPATIENT)
Dept: PHYSICAL THERAPY | Age: 72
Discharge: HOME | End: 2017-03-23
Payer: COMMERCIAL

## 2017-03-23 PROCEDURE — 97110 THERAPEUTIC EXERCISES: CPT

## 2017-03-23 PROCEDURE — 97140 MANUAL THERAPY 1/> REGIONS: CPT

## 2017-03-23 PROCEDURE — 97016 VASOPNEUMATIC DEVICE THERAPY: CPT

## 2017-03-23 NOTE — PROGRESS NOTES
Abiel Michaud  : 1945 [facility] [place] [address] [facility]  [phone]   [phone]           OUTPATIENT PHYSICAL THERAPY:Daily Note 3/23/2017    ICD-10: Treatment Diagnosis: Unspecified rotator cuff tear or rupture of left shoulder, not specified as traumatic (M75.102); Pain in left shoulder (M25.512)  Precautions/Allergies:   Review of patient's allergies indicates no known allergies. Fall Risk Score: 1 (? 5 = High Risk)  MD Orders: Evaluate and Treat MEDICAL/REFERRING DIAGNOSIS:  Unspecified rotator cuff tear or rupture of left shoulder, not specified as traumatic [M75.102]   DATE OF ONSET: Surgery 03-15-17  REFERRING PHYSICIAN: Jere Escobar MD  RETURN PHYSICIAN APPOINTMENT: 17     INITIAL ASSESSMENT:  Mr. Vinod Miller presents with decreased ROM/strength L shoulder with increased pain leading to decreased functional status s/p L shoulder surgery 03-15-17. Pt would benefit from skilled physical therapy services to address the above deficits and help patient return to prior level of function. PROBLEM LIST (Impacting functional limitations):  1. Decreased Strength  2. Decreased ADL/Functional Activities  3. Increased Pain  4. Decreased Flexibility/Joint Mobility  5. Decreased [place] with Home Exercise Program INTERVENTIONS PLANNED:  1. Cold  2. Cryotherapy  3. Electrical Stimulation  4. Home Exercise Program (HEP)  5. Manual Therapy  6. Range of Motion (ROM)  7. Therapeutic Exercise/Strengthening   TREATMENT PLAN:  Effective Dates: 17 TO 17. Frequency/Duration: 2 times a week for 3 months  GOALS: (Goals have been discussed and agreed upon with patient.)  Short-Term Functional Goals: Time Frame: 4 weeks  1. Pt will increase PROM L shoulder flexion = 90 degrees, abduction = 90 degrees, external rotation = 70 degrees to assist with self care ADL's  2.  Pt will be independent with HEP  Discharge Goals: Time Frame: 12 weeks  1. Pt will increase ROM L shoulder flexion = 160 degrees, abduction = 160 degrees, external rotation = 80 degrees, internal rotation = 70 degrees to assist with self care and lawn activities  2. Pt will increase strength L shoulder 4/5 or greater to assist with self care and lawn activities  3. Pt will be independent with HEP  4. Pt will perform 20 minutes household and lawn activities independently with min to no c/o L shoulder pain  Rehabilitation Potential For Stated Goals: Good  Regarding Tonia Saha S Shahnaz's therapy, I certify that the treatment plan above will be carried out by a therapist or under their direction. Thank you for this referral,  Benson June, PT     Referring Physician Signature: Mark Dumont MD              Date                    The information in this section was collected on 03-22-17 (except where otherwise noted). HISTORY:   3/23/2017:  Pt rates his current L shoulder pain 1/10. He states he has been performing his HEP as directed. History of Present Injury/Illness (Reason for Referral):  Pt reports insidious onset L shoulder pain of 6-8 months duration. He had an MRI which revealed a rotator cuff tear and surgery was scheduled. Pt had arthroscopic rotator cuff repair, arthroscopic distal clavicle excision (Benoit procedure), arthroscopic labral debridement and arthroscopic subacromial decompression, left shoulder on 03-15-17. He rates his current L shoulder pain 1/10 sitting at rest, increasing to 4/10 at worst over past couple of days. He is R-handed. Pt works as an . He is currently working part time/modified duty due to his L shoulder surgery. Pt is taking oxycodone 5 mg prn for pain. He took some pain meds prior to his PT session today, but had not taken any for about 3 days prior to today. He is currently having difficulties with all self care and household/lawn activities due to his L shoulder surgery.   Past Medical History/Comorbidities:   Mr. Gilberto Velarde  has a past medical history of Arrhythmia; Enlarged prostate; H/O seasonal allergies; Hypercholesteremia; and Rotator cuff tear, left. Mr. Gilberto Velarde  has a past surgical history that includes cervical diskectomy (05/2000); urological (1979); vasectomy (1970's); and colonoscopy. Social History/Living Environment:     Pt lives with wife who assists with ADL's as needed  Prior Level of Function/Work/Activity:  Independent with all work and household/lawn activities due to his L shoulder surgery  Dominant Side:         RIGHT  Other Clinical Tests:          MRI  Previous Treatment Approaches:          Surgery 03-15-17  Personal Factors:          Age:  67 y.o. Profession:  Pt works as an   Current Medications:       Current Outpatient Prescriptions:     finasteride (PROSCAR) 5 mg tablet, Take 5 mg by mouth every morning., Disp: , Rfl:     montelukast (SINGULAIR) 10 mg tablet, Take 10 mg by mouth nightly., Disp: , Rfl:     aspirin delayed-release 81 mg tablet, Take 81 mg by mouth every morning., Disp: , Rfl:     atorvastatin (LIPITOR) 40 mg tablet, Take 40 mg by mouth every morning., Disp: , Rfl:     Azelastine (ASTEPRO) 0.15 % (205.5 mcg) nasal spray, 2 Sprays by Both Nostrils route DIALYSIS PRN., Disp: , Rfl:     cyanocobalamin 1,000 mcg tablet, Take 1,000 mcg by mouth daily. , Disp: , Rfl:     Omega-3-DHA-EPA-Fish Oil (FISH OIL) 1,000 mg (120 mg-180 mg) cap, Take  by mouth two (2) times a day. Holding for suregey, Disp: , Rfl:     dronedarone (MULTAQ) tab tablet, Take 400 mg by mouth two (2) times daily (with meals). Indications: PAROXYSMAL ATRIAL FIBRILLATION, Disp: , Rfl:     multivitamin (ONE A DAY) tablet, Take 1 Tab by mouth daily. Holding till surgery, Disp: , Rfl:     silodosin (RAPAFLO) 4 mg capsule, Take 4 mg by mouth nightly., Disp: , Rfl:     sildenafil citrate (VIAGRA) 100 mg tablet, Take 100 mg by mouth as needed. , Disp: , Rfl:     cholecalciferol, vitamin D3, (VITAMIN D3) 2,000 unit tab, Take  by mouth. Holding for surgery, Disp: , Rfl:    Date Last Reviewed:  03-22-17   Number of Personal Factors/Comorbidities that affect the Plan of Care: 1-2: MODERATE COMPLEXITY   EXAMINATION:   Observation/Orthostatic Postural Assessment: Forward head, rounded shoulders; L UE in sling  Palpation:          Generalized tenderness L shoulder  ROM: (PROM)    L shoulder flexion = 70 degrees  L shoulder abduction = 45 degrees  L shoulder external rotation = 30 degrees    Strength:      Not assessed    Special Tests:      Not assessed    Neurological Screen:        Sensation: Intact to light touch L UE  Functional Mobility:         Pt able to transition sit to supine and supine to sit independently    Body Structures Involved:  1. Bones  2. Joints  3. Muscles Body Functions Affected:  1. Sensory/Pain  2. Neuromusculoskeletal Activities and Participation Affected:  1. Mobility  2. Self Care  3. Domestic Life   Number of elements (examined above) that affect the Plan of Care: 3: MODERATE COMPLEXITY   CLINICAL PRESENTATION:   Presentation: Evolving clinical presentation with changing clinical characteristics: MODERATE COMPLEXITY   CLINICAL DECISION MAKING:   Outcome Measure: Tool Used: Disabilities of the Arm, Shoulder and Hand (DASH) Questionnaire - Quick Version  Score:  Initial: 45/55  Most Recent: X/55 (Date: -- )   Interpretation of Score: The DASH is designed to measure the activities of daily living in person's with upper extremity dysfunction or pain. Each section is scored on a 1-5 scale, 5 representing the greatest disability. The scores of each section are added together for a total score of 55. Medical Necessity:   · Patient is expected to demonstrate progress in strength, range of motion and functional technique to increase independence with self care, household and lawn activities.   · Patient demonstrates good rehab potential due to higher previous functional level. Reason for Services/Other Comments:  · Patient continues to require skilled intervention due to decreased ROM/strength L shoulder with increased pain leading to decreased functional status. Use of outcome tool(s) and clinical judgement create a POC that gives a: Questionable prediction of patient's progress: MODERATE COMPLEXITY            TREATMENT:   (In addition to Assessment/Re-Assessment sessions the following treatments were rendered)  Pre-treatment Symptoms/Complaints:  L shoulder pain, decreased ROM and strength  Pain: Initial:     1/10 Post Session:  1/10     THERAPEUTIC EXERCISE: (20 minutes):  Exercises per grid below to improve mobility and strength. Required minimal verbal cues to promote proper body alignment and promote proper body posture. Progressed resistance, range and repetitions as indicated. MANUAL THERAPY: (15 minutes): PROM to L shoulder into flexion, abduction and external rotation was utilized and necessary because of the patient's restricted joint motion and restricted motion of soft tissue. MODALITIES: (10 minutes):      Game Ready Vasopneumatic Compression with ice to L shoulder x10 minutes to decrease pain and inflammation. Skin clear afterwards. Date:  03-22-17 Date:  03-23-17 Date:     Activity/Exercise Parameters Parameters Parameters   AROM L wrist Flex/Ext and RD/UD 20 reps each 20 reps each    AAROM L Forearm Pron/Sup 20 reps each 20 reps each    AAROM L Elbow Flex/Ext 20 reps each 20 reps each    Scapular Squeezes 20 reps 20 reps    Pendulum Exercises (Circles CW/CCW) 20 reps each 20 reps each    Blue Gripper  50 reps                Treatment/Session Assessment:    · Response to Treatment:  Pt tolerated treatments well today with no c/o. PROM progressing well. · Compliance with Program/Exercises: Will assess as treatment progresses. · Recommendations/Intent for next treatment session:  \"Next visit will focus on ROM L shoulder\".   Total Treatment Duration:  45 minutes  PT Patient Time In/Time Out  Time In: 1300  Time Out: [address] Genny Brewer, PT

## 2017-03-27 ENCOUNTER — HOSPITAL ENCOUNTER (OUTPATIENT)
Dept: PHYSICAL THERAPY | Age: 72
Discharge: HOME | End: 2017-03-27
Payer: COMMERCIAL

## 2017-03-27 PROCEDURE — 97016 VASOPNEUMATIC DEVICE THERAPY: CPT

## 2017-03-27 PROCEDURE — 97140 MANUAL THERAPY 1/> REGIONS: CPT

## 2017-03-27 PROCEDURE — 97110 THERAPEUTIC EXERCISES: CPT

## 2017-03-27 NOTE — PROGRESS NOTES
Cordelia Shone  : 1945 [facility] [place] [address], [address] [facility]  [phone]   [phone]           OUTPATIENT PHYSICAL THERAPY:Daily Note 3/27/2017    ICD-10: Treatment Diagnosis: Unspecified rotator cuff tear or rupture of left shoulder, not specified as traumatic (M75.102); Pain in left shoulder (M25.512)  Precautions/Allergies:   Review of patient's allergies indicates no known allergies. Fall Risk Score: 1 (? 5 = High Risk)  MD Orders: Evaluate and Treat MEDICAL/REFERRING DIAGNOSIS:  Unspecified rotator cuff tear or rupture of left shoulder, not specified as traumatic [M75.102]   DATE OF ONSET: Surgery 03-15-17  REFERRING PHYSICIAN: Sarah Hayden MD  RETURN PHYSICIAN APPOINTMENT: 17     INITIAL ASSESSMENT:  Mr. Bonifacio Gallardo presents with decreased ROM/strength L shoulder with increased pain leading to decreased functional status s/p L shoulder surgery 03-15-17. Pt would benefit from skilled physical therapy services to address the above deficits and help patient return to prior level of function. PROBLEM LIST (Impacting functional limitations):  1. Decreased Strength  2. Decreased ADL/Functional Activities  3. Increased Pain  4. Decreased Flexibility/Joint Mobility  5. Decreased [place] with Home Exercise Program INTERVENTIONS PLANNED:  1. Cold  2. Cryotherapy  3. Electrical Stimulation  4. Home Exercise Program (HEP)  5. Manual Therapy  6. Range of Motion (ROM)  7. Therapeutic Exercise/Strengthening   TREATMENT PLAN:  Effective Dates: 17 TO 17. Frequency/Duration: 2 times a week for 3 months  GOALS: (Goals have been discussed and agreed upon with patient.)  Short-Term Functional Goals: Time Frame: 4 weeks  1. Pt will increase PROM L shoulder flexion = 90 degrees, abduction = 90 degrees, external rotation = 70 degrees to assist with self care ADL's  2.  Pt will be independent with HEP  Discharge Goals: Time Frame: 12 weeks  1. Pt will increase ROM L shoulder flexion = 160 degrees, abduction = 160 degrees, external rotation = 80 degrees, internal rotation = 70 degrees to assist with self care and lawn activities  2. Pt will increase strength L shoulder 4/5 or greater to assist with self care and lawn activities  3. Pt will be independent with HEP  4. Pt will perform 20 minutes household and lawn activities independently with min to no c/o L shoulder pain  Rehabilitation Potential For Stated Goals: Good  Regarding Renate Christie's therapy, I certify that the treatment plan above will be carried out by a therapist or under their direction. Thank you for this referral,  Ramiro Anderson, PT     Referring Physician Signature: Emani Hooper MD              Date                    The information in this section was collected on 03-22-17 (except where otherwise noted). HISTORY:   3/27/2017:  Pt rates his current L shoulder pain 1/10. History of Present Injury/Illness (Reason for Referral):  Pt reports insidious onset L shoulder pain of 6-8 months duration. He had an MRI which revealed a rotator cuff tear and surgery was scheduled. Pt had arthroscopic rotator cuff repair, arthroscopic distal clavicle excision (Benoit procedure), arthroscopic labral debridement and arthroscopic subacromial decompression, left shoulder on 03-15-17. He rates his current L shoulder pain 1/10 sitting at rest, increasing to 4/10 at worst over past couple of days. He is R-handed. Pt works as an . He is currently working part time/modified duty due to his L shoulder surgery. Pt is taking oxycodone 5 mg prn for pain. He took some pain meds prior to his PT session today, but had not taken any for about 3 days prior to today. He is currently having difficulties with all self care and household/lawn activities due to his L shoulder surgery.   Past Medical History/Comorbidities:   Mr. Sarita Samuel  has a past medical history of Arrhythmia; Enlarged prostate; H/O seasonal allergies; Hypercholesteremia; and Rotator cuff tear, left. Mr. Fatmata Best  has a past surgical history that includes cervical diskectomy (05/2000); urological (1979); vasectomy (1970's); and colonoscopy. Social History/Living Environment:     Pt lives with wife who assists with ADL's as needed  Prior Level of Function/Work/Activity:  Independent with all work and household/lawn activities due to his L shoulder surgery  Dominant Side:         RIGHT  Other Clinical Tests:          MRI  Previous Treatment Approaches:          Surgery 03-15-17  Personal Factors:          Age:  67 y.o. Profession:  Pt works as an   Current Medications:       Current Outpatient Prescriptions:     finasteride (PROSCAR) 5 mg tablet, Take 5 mg by mouth every morning., Disp: , Rfl:     montelukast (SINGULAIR) 10 mg tablet, Take 10 mg by mouth nightly., Disp: , Rfl:     aspirin delayed-release 81 mg tablet, Take 81 mg by mouth every morning., Disp: , Rfl:     atorvastatin (LIPITOR) 40 mg tablet, Take 40 mg by mouth every morning., Disp: , Rfl:     Azelastine (ASTEPRO) 0.15 % (205.5 mcg) nasal spray, 2 Sprays by Both Nostrils route DIALYSIS PRN., Disp: , Rfl:     cyanocobalamin 1,000 mcg tablet, Take 1,000 mcg by mouth daily. , Disp: , Rfl:     Omega-3-DHA-EPA-Fish Oil (FISH OIL) 1,000 mg (120 mg-180 mg) cap, Take  by mouth two (2) times a day. Holding for suregey, Disp: , Rfl:     dronedarone (MULTAQ) tab tablet, Take 400 mg by mouth two (2) times daily (with meals). Indications: PAROXYSMAL ATRIAL FIBRILLATION, Disp: , Rfl:     multivitamin (ONE A DAY) tablet, Take 1 Tab by mouth daily. Holding till surgery, Disp: , Rfl:     silodosin (RAPAFLO) 4 mg capsule, Take 4 mg by mouth nightly., Disp: , Rfl:     sildenafil citrate (VIAGRA) 100 mg tablet, Take 100 mg by mouth as needed. , Disp: , Rfl:     cholecalciferol, vitamin D3, (VITAMIN D3) 2,000 unit tab, Take  by mouth. Holding for surgery, Disp: , Rfl:    Date Last Reviewed:  03-22-17   Number of Personal Factors/Comorbidities that affect the Plan of Care: 1-2: MODERATE COMPLEXITY   EXAMINATION:   Observation/Orthostatic Postural Assessment: Forward head, rounded shoulders; L UE in sling  Palpation:          Generalized tenderness L shoulder  ROM: (PROM)    L shoulder flexion = 70 degrees  L shoulder abduction = 45 degrees  L shoulder external rotation = 30 degrees    Strength:      Not assessed    Special Tests:      Not assessed    Neurological Screen:        Sensation: Intact to light touch L UE  Functional Mobility:         Pt able to transition sit to supine and supine to sit independently    Body Structures Involved:  1. Bones  2. Joints  3. Muscles Body Functions Affected:  1. Sensory/Pain  2. Neuromusculoskeletal Activities and Participation Affected:  1. Mobility  2. Self Care  3. Domestic Life   Number of elements (examined above) that affect the Plan of Care: 3: MODERATE COMPLEXITY   CLINICAL PRESENTATION:   Presentation: Evolving clinical presentation with changing clinical characteristics: MODERATE COMPLEXITY   CLINICAL DECISION MAKING:   Outcome Measure: Tool Used: Disabilities of the Arm, Shoulder and Hand (DASH) Questionnaire - Quick Version  Score:  Initial: 45/55  Most Recent: X/55 (Date: -- )   Interpretation of Score: The DASH is designed to measure the activities of daily living in person's with upper extremity dysfunction or pain. Each section is scored on a 1-5 scale, 5 representing the greatest disability. The scores of each section are added together for a total score of 55. Medical Necessity:   · Patient is expected to demonstrate progress in strength, range of motion and functional technique to increase independence with self care, household and lawn activities.   · Patient demonstrates good rehab potential due to higher previous functional level.  Reason for Services/Other Comments:  · Patient continues to require skilled intervention due to decreased ROM/strength L shoulder with increased pain leading to decreased functional status. Use of outcome tool(s) and clinical judgement create a POC that gives a: Questionable prediction of patient's progress: MODERATE COMPLEXITY            TREATMENT:   (In addition to Assessment/Re-Assessment sessions the following treatments were rendered)  Pre-treatment Symptoms/Complaints:  L shoulder pain, decreased ROM and strength  Pain: Initial:     1/10 Post Session:  1/10     THERAPEUTIC EXERCISE: (15 minutes):  Exercises per grid below to improve mobility and strength. Required minimal verbal cues to promote proper body alignment and promote proper body posture. Progressed resistance, range and repetitions as indicated. MANUAL THERAPY: (15 minutes): PROM to L shoulder into flexion, abduction and external rotation was utilized and necessary because of the patient's restricted joint motion and restricted motion of soft tissue. MODALITIES: (10 minutes):      Game Ready Vasopneumatic Compression with ice to L shoulder x10 minutes to decrease pain and inflammation. Skin clear afterwards. Date:  03-22-17 Date:  03-23-17 Date:  03-27-17   Activity/Exercise Parameters Parameters Parameters   AROM L wrist Flex/Ext and RD/UD 20 reps each 20 reps each 20 reps each   AAROM L Forearm Pron/Sup 20 reps each 20 reps each 20 reps each   AAROM L Elbow Flex/Ext 20 reps each 20 reps each 20 reps each   Scapular Squeezes 20 reps 20 reps 20 reps   Pendulum Exercises (Circles CW/CCW) 20 reps each 20 reps each 20 reps each   Blue Gripper  50 reps 50 reps               Treatment/Session Assessment:    · Response to Treatment:  Pt performing HEP as directed. PROM doing well. · Compliance with Program/Exercises: Will assess as treatment progresses. · Recommendations/Intent for next treatment session:  \"Next visit will focus on ROM L shoulder\".   Total Treatment Duration:  40 minutes  PT Patient Time In/Time Out  Time In: 1600  Time Out: Reuben Arndt [address] Clifford England, PT

## 2017-03-30 ENCOUNTER — HOSPITAL ENCOUNTER (OUTPATIENT)
Dept: PHYSICAL THERAPY | Age: 72
Discharge: HOME | End: 2017-03-30
Payer: COMMERCIAL

## 2017-03-30 PROCEDURE — 97016 VASOPNEUMATIC DEVICE THERAPY: CPT

## 2017-03-30 PROCEDURE — 97140 MANUAL THERAPY 1/> REGIONS: CPT

## 2017-03-30 PROCEDURE — 97110 THERAPEUTIC EXERCISES: CPT

## 2017-03-30 NOTE — PROGRESS NOTES
Stephanie Huitron  : 1945 [facility]  [address], [address] [place] [address],[address], [place] [address].  [phone]   [phone]           OUTPATIENT PHYSICAL THERAPY:Daily Note 3/30/2017    ICD-10: Treatment Diagnosis: Unspecified rotator cuff tear or rupture of left shoulder, not specified as traumatic (M75.102); Pain in left shoulder (M25.512)  Precautions/Allergies:   Review of patient's allergies indicates no known allergies. Fall Risk Score: 1 (? 5 = High Risk)  MD Orders: Evaluate and Treat MEDICAL/REFERRING DIAGNOSIS:  Unspecified rotator cuff tear or rupture of left shoulder, not specified as traumatic [M75.102]   DATE OF ONSET: Surgery 03-15-17  REFERRING PHYSICIAN: Zarina Chaidez MD  RETURN PHYSICIAN APPOINTMENT: 17     INITIAL ASSESSMENT:  Mr. Shameka Anderson presents with decreased ROM/strength L shoulder with increased pain leading to decreased functional status s/p L shoulder surgery 03-15-17. Pt would benefit from skilled physical therapy services to address the above deficits and help patient return to prior level of function. PROBLEM LIST (Impacting functional limitations):  1. Decreased Strength  2. Decreased ADL/Functional Activities  3. Increased Pain  4. Decreased Flexibility/Joint Mobility  5. Decreased [place] with Home Exercise Program INTERVENTIONS PLANNED:  1. Cold  2. Cryotherapy  3. Electrical Stimulation  4. Home Exercise Program (HEP)  5. Manual Therapy  6. Range of Motion (ROM)  7. Therapeutic Exercise/Strengthening   TREATMENT PLAN:  Effective Dates: 17 TO 17. Frequency/Duration: 2 times a week for 3 months  GOALS: (Goals have been discussed and agreed upon with patient.)  Short-Term Functional Goals: Time Frame: 4 weeks  1. Pt will increase PROM L shoulder flexion = 90 degrees, abduction = 90 degrees, external rotation = 70 degrees to assist with self care ADL's  2.  Pt will be independent with HEP  Discharge Goals: Time Frame: 12 weeks  1. Pt will increase ROM L shoulder flexion = 160 degrees, abduction = 160 degrees, external rotation = 80 degrees, internal rotation = 70 degrees to assist with self care and lawn activities  2. Pt will increase strength L shoulder 4/5 or greater to assist with self care and lawn activities  3. Pt will be independent with HEP  4. Pt will perform 20 minutes household and lawn activities independently with min to no c/o L shoulder pain  Rehabilitation Potential For Stated Goals: Good  Regarding Yenifer Poisson ROBLES MarieeShahnaz's therapy, I certify that the treatment plan above will be carried out by a therapist or under their direction. Thank you for this referral,  Gary Bass PT     Referring Physician Signature: Jacinda Vicente MD              Date                    The information in this section was collected on 03-22-17 (except where otherwise noted). HISTORY:   3/30/2017:  Pt rates his current L shoulder pain 0/10. He saw MD for follow up on Tuesday and pt states MD said his shoulder is looking good. History of Present Injury/Illness (Reason for Referral):  Pt reports insidious onset L shoulder pain of 6-8 months duration. He had an MRI which revealed a rotator cuff tear and surgery was scheduled. Pt had arthroscopic rotator cuff repair, arthroscopic distal clavicle excision (Benoit procedure), arthroscopic labral debridement and arthroscopic subacromial decompression, left shoulder on 03-15-17. He rates his current L shoulder pain 1/10 sitting at rest, increasing to 4/10 at worst over past couple of days. He is R-handed. Pt works as an . He is currently working part time/modified duty due to his L shoulder surgery. Pt is taking oxycodone 5 mg prn for pain. He took some pain meds prior to his PT session today, but had not taken any for about 3 days prior to today.   He is currently having difficulties with all self care and household/lawn activities due to his L shoulder surgery. Past Medical History/Comorbidities:   Mr. Benny Mora  has a past medical history of Arrhythmia; Enlarged prostate; H/O seasonal allergies; Hypercholesteremia; and Rotator cuff tear, left. Mr. Benny Mora  has a past surgical history that includes cervical diskectomy (05/2000); urological (1979); vasectomy (1970's); and colonoscopy. Social History/Living Environment:     Pt lives with wife who assists with ADL's as needed  Prior Level of Function/Work/Activity:  Independent with all work and household/lawn activities due to his L shoulder surgery  Dominant Side:         RIGHT  Other Clinical Tests:          MRI  Previous Treatment Approaches:          Surgery 03-15-17  Personal Factors:          Age:  67 y.o. Profession:  Pt works as an   Current Medications:       Current Outpatient Prescriptions:     finasteride (PROSCAR) 5 mg tablet, Take 5 mg by mouth every morning., Disp: , Rfl:     montelukast (SINGULAIR) 10 mg tablet, Take 10 mg by mouth nightly., Disp: , Rfl:     aspirin delayed-release 81 mg tablet, Take 81 mg by mouth every morning., Disp: , Rfl:     atorvastatin (LIPITOR) 40 mg tablet, Take 40 mg by mouth every morning., Disp: , Rfl:     Azelastine (ASTEPRO) 0.15 % (205.5 mcg) nasal spray, 2 Sprays by Both Nostrils route DIALYSIS PRN., Disp: , Rfl:     cyanocobalamin 1,000 mcg tablet, Take 1,000 mcg by mouth daily. , Disp: , Rfl:     Omega-3-DHA-EPA-Fish Oil (FISH OIL) 1,000 mg (120 mg-180 mg) cap, Take  by mouth two (2) times a day. Holding for suregey, Disp: , Rfl:     dronedarone (MULTAQ) tab tablet, Take 400 mg by mouth two (2) times daily (with meals). Indications: PAROXYSMAL ATRIAL FIBRILLATION, Disp: , Rfl:     multivitamin (ONE A DAY) tablet, Take 1 Tab by mouth daily.  Holding till surgery, Disp: , Rfl:     silodosin (RAPAFLO) 4 mg capsule, Take 4 mg by mouth nightly., Disp: , Rfl:     sildenafil citrate (VIAGRA) 100 mg tablet, Take 100 mg by mouth as needed. , Disp: , Rfl:     cholecalciferol, vitamin D3, (VITAMIN D3) 2,000 unit tab, Take  by mouth. Holding for surgery, Disp: , Rfl:    Date Last Reviewed:  03-22-17   Number of Personal Factors/Comorbidities that affect the Plan of Care: 1-2: MODERATE COMPLEXITY   EXAMINATION:   Observation/Orthostatic Postural Assessment: Forward head, rounded shoulders; L UE in sling  Palpation:          Generalized tenderness L shoulder  ROM: (PROM)    L shoulder flexion = 70 degrees  L shoulder abduction = 45 degrees  L shoulder external rotation = 30 degrees    Strength:      Not assessed    Special Tests:      Not assessed    Neurological Screen:        Sensation: Intact to light touch L UE  Functional Mobility:         Pt able to transition sit to supine and supine to sit independently    Body Structures Involved:  1. Bones  2. Joints  3. Muscles Body Functions Affected:  1. Sensory/Pain  2. Neuromusculoskeletal Activities and Participation Affected:  1. Mobility  2. Self Care  3. Domestic Life   Number of elements (examined above) that affect the Plan of Care: 3: MODERATE COMPLEXITY   CLINICAL PRESENTATION:   Presentation: Evolving clinical presentation with changing clinical characteristics: MODERATE COMPLEXITY   CLINICAL DECISION MAKING:   Outcome Measure: Tool Used: Disabilities of the Arm, Shoulder and Hand (DASH) Questionnaire - Quick Version  Score:  Initial: 45/55  Most Recent: X/55 (Date: -- )   Interpretation of Score: The DASH is designed to measure the activities of daily living in person's with upper extremity dysfunction or pain. Each section is scored on a 1-5 scale, 5 representing the greatest disability. The scores of each section are added together for a total score of 55.           Medical Necessity:   · Patient is expected to demonstrate progress in strength, range of motion and functional technique to increase independence with self care, household and lawn activities. · Patient demonstrates good rehab potential due to higher previous functional level. Reason for Services/Other Comments:  · Patient continues to require skilled intervention due to decreased ROM/strength L shoulder with increased pain leading to decreased functional status. Use of outcome tool(s) and clinical judgement create a POC that gives a: Questionable prediction of patient's progress: MODERATE COMPLEXITY            TREATMENT:   (In addition to Assessment/Re-Assessment sessions the following treatments were rendered)  Pre-treatment Symptoms/Complaints:  L shoulder pain, decreased ROM and strength  Pain: Initial:     1/10 Post Session:  1/10     THERAPEUTIC EXERCISE: (15 minutes):  Exercises per grid below to improve mobility and strength. Required minimal verbal cues to promote proper body alignment and promote proper body posture. Progressed resistance, range and repetitions as indicated. MANUAL THERAPY: (15 minutes): PROM to L shoulder into flexion, abduction and external rotation was utilized and necessary because of the patient's restricted joint motion and restricted motion of soft tissue. MODALITIES: (10 minutes):      Game Ready Vasopneumatic Compression with ice to L shoulder x10 minutes to decrease pain and inflammation. Skin clear afterwards. Date:  03-30-17   Activity/Exercise Parameters   AROM L wrist Flex/Ext and RD/UD 20 reps each   AAROM L Forearm Pron/Sup 20 reps each   AAROM L Elbow Flex/Ext 20 reps each   Scapular Squeezes 20 reps   Pendulum Exercises (Circles CW/CCW) 20 reps each   Blue Gripper 50 reps             Treatment/Session Assessment:    · Response to Treatment:  Pt did well with all treatments today with no c/o. · Compliance with Program/Exercises: Will assess as treatment progresses. · Recommendations/Intent for next treatment session: \"Next visit will focus on ROM L shoulder\".   Total Treatment Duration:  40 minutes  PT Patient Time In/Time Out  Time In: 0792  Time Out: [id number] Matheus Abreu

## 2017-04-03 ENCOUNTER — HOSPITAL ENCOUNTER (OUTPATIENT)
Dept: PHYSICAL THERAPY | Age: 72
Discharge: HOME | End: 2017-04-03
Payer: COMMERCIAL

## 2017-04-03 PROCEDURE — 97110 THERAPEUTIC EXERCISES: CPT

## 2017-04-03 PROCEDURE — 97016 VASOPNEUMATIC DEVICE THERAPY: CPT

## 2017-04-03 PROCEDURE — 97140 MANUAL THERAPY 1/> REGIONS: CPT

## 2017-04-03 NOTE — PROGRESS NOTES
Stephanie Huitron  : 1945 [facility]  [address], [address], [address] [facility]  [phone]   [phone]           OUTPATIENT PHYSICAL THERAPY:Daily Note 4/3/2017    ICD-10: Treatment Diagnosis: Unspecified rotator cuff tear or rupture of left shoulder, not specified as traumatic (M75.102); Pain in left shoulder (M25.512)  Precautions/Allergies:   Review of patient's allergies indicates no known allergies. Fall Risk Score: 1 (? 5 = High Risk)  MD Orders: Evaluate and Treat MEDICAL/REFERRING DIAGNOSIS:  Unspecified rotator cuff tear or rupture of left shoulder, not specified as traumatic [M75.102]   DATE OF ONSET: Surgery 03-15-17  REFERRING PHYSICIAN: Zarina Chaidez MD  RETURN PHYSICIAN APPOINTMENT: 17     INITIAL ASSESSMENT:  Mr. Shameka Anderson presents with decreased ROM/strength L shoulder with increased pain leading to decreased functional status s/p L shoulder surgery 03-15-17. Pt would benefit from skilled physical therapy services to address the above deficits and help patient return to prior level of function. PROBLEM LIST (Impacting functional limitations):  1. Decreased Strength  2. Decreased ADL/Functional Activities  3. Increased Pain  4. Decreased Flexibility/Joint Mobility  5. Decreased [place] with Home Exercise Program INTERVENTIONS PLANNED:  1. Cold  2. Cryotherapy  3. Electrical Stimulation  4. Home Exercise Program (HEP)  5. Manual Therapy  6. Range of Motion (ROM)  7. Therapeutic Exercise/Strengthening   TREATMENT PLAN:  Effective Dates: 17 TO 17. Frequency/Duration: 2 times a week for 3 months  GOALS: (Goals have been discussed and agreed upon with patient.)  Short-Term Functional Goals: Time Frame: 4 weeks  1. Pt will increase PROM L shoulder flexion = 90 degrees, abduction = 90 degrees, external rotation = 70 degrees to assist with self care ADL's  2.  Pt will be independent with HEP  Discharge Goals: Time Frame: 12 weeks  1. Pt will increase ROM L shoulder flexion = 160 degrees, abduction = 160 degrees, external rotation = 80 degrees, internal rotation = 70 degrees to assist with self care and lawn activities  2. Pt will increase strength L shoulder 4/5 or greater to assist with self care and lawn activities  3. Pt will be independent with HEP  4. Pt will perform 20 minutes household and lawn activities independently with min to no c/o L shoulder pain  Rehabilitation Potential For Stated Goals: Good  Regarding Viridiana Davis S Shahnaz's therapy, I certify that the treatment plan above will be carried out by a therapist or under their direction. Thank you for this referral,  Conrado Sheldon, PT     Referring Physician Signature: Jean Carlos Alvarez MD              Date                    The information in this section was collected on 03-22-17 (except where otherwise noted). HISTORY:   4/3/2017:  Pt states he did some driving this weekend and it went well. History of Present Injury/Illness (Reason for Referral):  Pt reports insidious onset L shoulder pain of 6-8 months duration. He had an MRI which revealed a rotator cuff tear and surgery was scheduled. Pt had arthroscopic rotator cuff repair, arthroscopic distal clavicle excision (Benoit procedure), arthroscopic labral debridement and arthroscopic subacromial decompression, left shoulder on 03-15-17. He rates his current L shoulder pain 1/10 sitting at rest, increasing to 4/10 at worst over past couple of days. He is R-handed. Pt works as an . He is currently working part time/modified duty due to his L shoulder surgery. Pt is taking oxycodone 5 mg prn for pain. He took some pain meds prior to his PT session today, but had not taken any for about 3 days prior to today. He is currently having difficulties with all self care and household/lawn activities due to his L shoulder surgery.   Past Medical History/Comorbidities:    Jaycob Skelton  has a past medical history of Arrhythmia; Enlarged prostate; H/O seasonal allergies; Hypercholesteremia; and Rotator cuff tear, left. Mr. Jaycob Skelton  has a past surgical history that includes cervical diskectomy (05/2000); urological (1979); vasectomy (1970's); and colonoscopy. Social History/Living Environment:     Pt lives with wife who assists with ADL's as needed  Prior Level of Function/Work/Activity:  Independent with all work and household/lawn activities due to his L shoulder surgery  Dominant Side:         RIGHT  Other Clinical Tests:          MRI  Previous Treatment Approaches:          Surgery 03-15-17  Personal Factors:          Age:  67 y.o. Profession:  Pt works as an   Current Medications:       Current Outpatient Prescriptions:     finasteride (PROSCAR) 5 mg tablet, Take 5 mg by mouth every morning., Disp: , Rfl:     montelukast (SINGULAIR) 10 mg tablet, Take 10 mg by mouth nightly., Disp: , Rfl:     aspirin delayed-release 81 mg tablet, Take 81 mg by mouth every morning., Disp: , Rfl:     atorvastatin (LIPITOR) 40 mg tablet, Take 40 mg by mouth every morning., Disp: , Rfl:     Azelastine (ASTEPRO) 0.15 % (205.5 mcg) nasal spray, 2 Sprays by Both Nostrils route DIALYSIS PRN., Disp: , Rfl:     cyanocobalamin 1,000 mcg tablet, Take 1,000 mcg by mouth daily. , Disp: , Rfl:     Omega-3-DHA-EPA-Fish Oil (FISH OIL) 1,000 mg (120 mg-180 mg) cap, Take  by mouth two (2) times a day. Holding for suregey, Disp: , Rfl:     dronedarone (MULTAQ) tab tablet, Take 400 mg by mouth two (2) times daily (with meals). Indications: PAROXYSMAL ATRIAL FIBRILLATION, Disp: , Rfl:     multivitamin (ONE A DAY) tablet, Take 1 Tab by mouth daily. Holding till surgery, Disp: , Rfl:     silodosin (RAPAFLO) 4 mg capsule, Take 4 mg by mouth nightly., Disp: , Rfl:     sildenafil citrate (VIAGRA) 100 mg tablet, Take 100 mg by mouth as needed. , Disp: , Rfl:     cholecalciferol, vitamin D3, (VITAMIN D3) 2,000 unit tab, Take  by mouth. Holding for surgery, Disp: , Rfl:    Date Last Reviewed:  03-22-17   Number of Personal Factors/Comorbidities that affect the Plan of Care: 1-2: MODERATE COMPLEXITY   EXAMINATION:   Observation/Orthostatic Postural Assessment: Forward head, rounded shoulders; L UE in sling  Palpation:          Generalized tenderness L shoulder  ROM: (PROM)    L shoulder flexion = 70 degrees  L shoulder abduction = 45 degrees  L shoulder external rotation = 30 degrees    Strength:      Not assessed    Special Tests:      Not assessed    Neurological Screen:        Sensation: Intact to light touch L UE  Functional Mobility:         Pt able to transition sit to supine and supine to sit independently    Body Structures Involved:  1. Bones  2. Joints  3. Muscles Body Functions Affected:  1. Sensory/Pain  2. Neuromusculoskeletal Activities and Participation Affected:  1. Mobility  2. Self Care  3. Domestic Life   Number of elements (examined above) that affect the Plan of Care: 3: MODERATE COMPLEXITY   CLINICAL PRESENTATION:   Presentation: Evolving clinical presentation with changing clinical characteristics: MODERATE COMPLEXITY   CLINICAL DECISION MAKING:   Outcome Measure: Tool Used: Disabilities of the Arm, Shoulder and Hand (DASH) Questionnaire - Quick Version  Score:  Initial: 45/55  Most Recent: X/55 (Date: -- )   Interpretation of Score: The DASH is designed to measure the activities of daily living in person's with upper extremity dysfunction or pain. Each section is scored on a 1-5 scale, 5 representing the greatest disability. The scores of each section are added together for a total score of 55. Medical Necessity:   · Patient is expected to demonstrate progress in strength, range of motion and functional technique to increase independence with self care, household and lawn activities.   · Patient demonstrates good rehab potential due to higher previous functional level. Reason for Services/Other Comments:  · Patient continues to require skilled intervention due to decreased ROM/strength L shoulder with increased pain leading to decreased functional status. Use of outcome tool(s) and clinical judgement create a POC that gives a: Questionable prediction of patient's progress: MODERATE COMPLEXITY            TREATMENT:   (In addition to Assessment/Re-Assessment sessions the following treatments were rendered)  Pre-treatment Symptoms/Complaints:  L shoulder pain, decreased ROM and strength  Pain: Initial:     1/10 Post Session:  1/10     THERAPEUTIC EXERCISE: (15 minutes):  Exercises per grid below to improve mobility and strength. Required minimal verbal cues to promote proper body alignment and promote proper body posture. Progressed resistance, range and repetitions as indicated. MANUAL THERAPY: (15 minutes): PROM to L shoulder into flexion, abduction and external rotation was utilized and necessary because of the patient's restricted joint motion and restricted motion of soft tissue. MODALITIES: (10 minutes):      Game Ready Vasopneumatic Compression with ice to L shoulder x10 minutes to decrease pain and inflammation. Skin clear afterwards. Date:  04-03-17   Activity/Exercise Parameters   AROM L wrist Flex/Ext and RD/UD 20 reps each   AAROM L Forearm Pron/Sup 20 reps each   AAROM L Elbow Flex/Ext 20 reps each   Scapular Squeezes 20 reps   Pendulum Exercises (Circles CW/CCW) 20 reps each   Blue Gripper 50 reps         Treatment/Session Assessment:    · Response to Treatment:  PROM doing well L shoulder. Pt having no c/o pain. · Compliance with Program/Exercises: Will assess as treatment progresses. · Recommendations/Intent for next treatment session: \"Next visit will focus on ROM L shoulder\".   Total Treatment Duration:  40 minutes  PT Patient Time In/Time Out  Time In: 1515  Time Out: [address] Tamanna Pierre, PT

## 2017-04-06 ENCOUNTER — HOSPITAL ENCOUNTER (OUTPATIENT)
Dept: PHYSICAL THERAPY | Age: 72
Discharge: HOME | End: 2017-04-06
Payer: COMMERCIAL

## 2017-04-06 PROCEDURE — 97016 VASOPNEUMATIC DEVICE THERAPY: CPT

## 2017-04-06 PROCEDURE — 97110 THERAPEUTIC EXERCISES: CPT

## 2017-04-06 PROCEDURE — 97140 MANUAL THERAPY 1/> REGIONS: CPT

## 2017-04-06 NOTE — PROGRESS NOTES
Molly Ying  : 1945 [facility] [place] [address], [place] [address].  [phone]   [phone]           OUTPATIENT PHYSICAL THERAPY:Daily Note 2017    ICD-10: Treatment Diagnosis: Unspecified rotator cuff tear or rupture of left shoulder, not specified as traumatic (M75.102); Pain in left shoulder (M25.512)  Precautions/Allergies:   Review of patient's allergies indicates no known allergies. Fall Risk Score: 1 (? 5 = High Risk)  MD Orders: Evaluate and Treat MEDICAL/REFERRING DIAGNOSIS:  Unspecified rotator cuff tear or rupture of left shoulder, not specified as traumatic [M75.102]   DATE OF ONSET: Surgery 03-15-17  REFERRING PHYSICIAN: Bridger Colby MD  RETURN PHYSICIAN APPOINTMENT: 17     INITIAL ASSESSMENT:  Mr. Jon Ragland presents with decreased ROM/strength L shoulder with increased pain leading to decreased functional status s/p L shoulder surgery 03-15-17. Pt would benefit from skilled physical therapy services to address the above deficits and help patient return to prior level of function. PROBLEM LIST (Impacting functional limitations):  1. Decreased Strength  2. Decreased ADL/Functional Activities  3. Increased Pain  4. Decreased Flexibility/Joint Mobility  5. Decreased [place] with Home Exercise Program INTERVENTIONS PLANNED:  1. Cold  2. Cryotherapy  3. Electrical Stimulation  4. Home Exercise Program (HEP)  5. Manual Therapy  6. Range of Motion (ROM)  7. Therapeutic Exercise/Strengthening   TREATMENT PLAN:  Effective Dates: 17 TO 17. Frequency/Duration: 2 times a week for 3 months  GOALS: (Goals have been discussed and agreed upon with patient.)  Short-Term Functional Goals: Time Frame: 4 weeks  1. Pt will increase PROM L shoulder flexion = 90 degrees, abduction = 90 degrees, external rotation = 70 degrees to assist with self care ADL's  2.  Pt will be independent with HEP  Discharge Goals: Time Frame: 12 weeks  1. Pt will increase ROM L shoulder flexion = 160 degrees, abduction = 160 degrees, external rotation = 80 degrees, internal rotation = 70 degrees to assist with self care and lawn activities  2. Pt will increase strength L shoulder 4/5 or greater to assist with self care and lawn activities  3. Pt will be independent with HEP  4. Pt will perform 20 minutes household and lawn activities independently with min to no c/o L shoulder pain  Rehabilitation Potential For Stated Goals: Good  [facility], I certify that the treatment plan above will be carried out by a therapist or under their direction. Thank you for this referral,  Rekha Shah PTA     Referring Physician Signature: Margret Gil MD              Date                    The information in this section was collected on 03-22-17 (except where otherwise noted). HISTORY:   4/6/2017:  Pt states he did some driving this weekend and it went well. History of Present Injury/Illness (Reason for Referral):  Pt reports insidious onset L shoulder pain of 6-8 months duration. He had an MRI which revealed a rotator cuff tear and surgery was scheduled. Pt had arthroscopic rotator cuff repair, arthroscopic distal clavicle excision (Benoit procedure), arthroscopic labral debridement and arthroscopic subacromial decompression, left shoulder on 03-15-17. He rates his current L shoulder pain 1/10 sitting at rest, increasing to 4/10 at worst over past couple of days. He is R-handed. Pt works as an . He is currently working part time/modified duty due to his L shoulder surgery. Pt is taking oxycodone 5 mg prn for pain. He took some pain meds prior to his PT session today, but had not taken any for about 3 days prior to today. He is currently having difficulties with all self care and household/lawn activities due to his L shoulder surgery.   Past Medical History/Comorbidities:    Deniz Ramsay  has a past medical history of Arrhythmia; Enlarged prostate; H/O seasonal allergies; Hypercholesteremia; and Rotator cuff tear, left. Mr. Deniz Ramsay  has a past surgical history that includes cervical diskectomy (05/2000); urological (1979); vasectomy (1970's); and colonoscopy. Social History/Living Environment:     Pt lives with wife who assists with ADL's as needed  Prior Level of Function/Work/Activity:  Independent with all work and household/lawn activities due to his L shoulder surgery  Dominant Side:         RIGHT  Other Clinical Tests:          MRI  Previous Treatment Approaches:          Surgery 03-15-17  Personal Factors:          Age:  67 y.o. Profession:  Pt works as an   Current Medications:       Current Outpatient Prescriptions:     finasteride (PROSCAR) 5 mg tablet, Take 5 mg by mouth every morning., Disp: , Rfl:     montelukast (SINGULAIR) 10 mg tablet, Take 10 mg by mouth nightly., Disp: , Rfl:     aspirin delayed-release 81 mg tablet, Take 81 mg by mouth every morning., Disp: , Rfl:     atorvastatin (LIPITOR) 40 mg tablet, Take 40 mg by mouth every morning., Disp: , Rfl:     Azelastine (ASTEPRO) 0.15 % (205.5 mcg) nasal spray, 2 Sprays by Both Nostrils route DIALYSIS PRN., Disp: , Rfl:     cyanocobalamin 1,000 mcg tablet, Take 1,000 mcg by mouth daily. , Disp: , Rfl:     Omega-3-DHA-EPA-Fish Oil (FISH OIL) 1,000 mg (120 mg-180 mg) cap, Take  by mouth two (2) times a day. Holding for suregey, Disp: , Rfl:     dronedarone (MULTAQ) tab tablet, Take 400 mg by mouth two (2) times daily (with meals). Indications: PAROXYSMAL ATRIAL FIBRILLATION, Disp: , Rfl:     multivitamin (ONE A DAY) tablet, Take 1 Tab by mouth daily. Holding till surgery, Disp: , Rfl:     silodosin (RAPAFLO) 4 mg capsule, Take 4 mg by mouth nightly., Disp: , Rfl:     sildenafil citrate (VIAGRA) 100 mg tablet, Take 100 mg by mouth as needed. , Disp: , Rfl:     cholecalciferol, vitamin D3, (VITAMIN D3) 2,000 unit tab, Take  by mouth. Holding for surgery, Disp: , Rfl:    Date Last Reviewed:  03-22-17   Number of Personal Factors/Comorbidities that affect the Plan of Care: 1-2: MODERATE COMPLEXITY   EXAMINATION:   Observation/Orthostatic Postural Assessment: Forward head, rounded shoulders; L UE in sling  Palpation:          Generalized tenderness L shoulder  ROM: (PROM)    L shoulder flexion = 70 degrees  L shoulder abduction = 45 degrees  L shoulder external rotation = 30 degrees    Strength:      Not assessed    Special Tests:      Not assessed    Neurological Screen:        Sensation: Intact to light touch L UE  Functional Mobility:         Pt able to transition sit to supine and supine to sit independently    Body Structures Involved:  1. Bones  2. Joints  3. Muscles Body Functions Affected:  1. Sensory/Pain  2. Neuromusculoskeletal Activities and Participation Affected:  1. Mobility  2. Self Care  3. Domestic Life   Number of elements (examined above) that affect the Plan of Care: 3: MODERATE COMPLEXITY   CLINICAL PRESENTATION:   Presentation: Evolving clinical presentation with changing clinical characteristics: MODERATE COMPLEXITY   CLINICAL DECISION MAKING:   Outcome Measure: Tool Used: Disabilities of the Arm, Shoulder and Hand (DASH) Questionnaire - Quick Version  Score:  Initial: 45/55  Most Recent: X/55 (Date: -- )   Interpretation of Score: The DASH is designed to measure the activities of daily living in person's with upper extremity dysfunction or pain. Each section is scored on a 1-5 scale, 5 representing the greatest disability. The scores of each section are added together for a total score of 55. Medical Necessity:   · Patient is expected to demonstrate progress in strength, range of motion and functional technique to increase independence with self care, household and lawn activities.   · Patient demonstrates good rehab potential due to higher previous functional level. Reason for Services/Other Comments:  · Patient continues to require skilled intervention due to decreased ROM/strength L shoulder with increased pain leading to decreased functional status. Use of outcome tool(s) and clinical judgement create a POC that gives a: Questionable prediction of patient's progress: MODERATE COMPLEXITY            TREATMENT:   (In addition to Assessment/Re-Assessment sessions the following treatments were rendered)  Pre-treatment Symptoms/Complaints:  L shoulder pain, decreased ROM and strength  Pain: Initial:     1/10 Post Session:  1/10     THERAPEUTIC EXERCISE: (15 minutes):  Exercises per grid below to improve mobility and strength. Required minimal verbal cues to promote proper body alignment and promote proper body posture. Progressed resistance, range and repetitions as indicated. MANUAL THERAPY: (15 minutes): PROM to L shoulder into flexion, abduction and external rotation was utilized and necessary because of the patient's restricted joint motion and restricted motion of soft tissue. MODALITIES: (10 minutes):      Game Ready Vasopneumatic Compression with ice to L shoulder x10 minutes to decrease pain and inflammation. Skin clear afterwards. Date:  4-6-17   Activity/Exercise Parameters   AROM L wrist Flex/Ext and RD/UD 20 reps each   AAROM L Forearm Pron/Sup 20 reps each   AAROM L Elbow Flex/Ext 20 reps each   Scapular Squeezes 20 reps   Pendulum Exercises (Circles CW/CCW) 20 reps each   Blue Gripper 50 reps         Treatment/Session Assessment:    · Response to Treatment:  PROM doing well L shoulder. Pt having no c/o pain. Good motion overall. · Compliance with Program/Exercises: Will assess as treatment progresses. · Recommendations/Intent for next treatment session: \"Next visit will focus on ROM L shoulder\".   Total Treatment Duration:  40 minutes  PT Patient Time In/Time Out  Time In: 3727  Time Out: CHARAN Abbott

## 2017-04-10 ENCOUNTER — HOSPITAL ENCOUNTER (OUTPATIENT)
Dept: PHYSICAL THERAPY | Age: 72
Discharge: HOME | End: 2017-04-10
Payer: COMMERCIAL

## 2017-04-10 PROCEDURE — 97016 VASOPNEUMATIC DEVICE THERAPY: CPT

## 2017-04-10 PROCEDURE — 97110 THERAPEUTIC EXERCISES: CPT

## 2017-04-10 PROCEDURE — 97140 MANUAL THERAPY 1/> REGIONS: CPT

## 2017-04-10 NOTE — PROGRESS NOTES
Yumiko Chatterjee  : [facility] [place] [address], [place] [address].  [phone]   [phone]           OUTPATIENT PHYSICAL THERAPY:Daily Note 4/10/2017    ICD-10: Treatment Diagnosis: Unspecified rotator cuff tear or rupture of left shoulder, not specified as traumatic (M75.102); Pain in left shoulder (M25.512)  Precautions/Allergies:   Review of patient's allergies indicates no known allergies. Fall Risk Score: 1 (? 5 = High Risk)  MD Orders: Evaluate and Treat MEDICAL/REFERRING DIAGNOSIS:  Unspecified rotator cuff tear or rupture of left shoulder, not specified as traumatic [M75.102]   DATE OF ONSET: Surgery 03-15-17  REFERRING PHYSICIAN: Stone Timmons MD  RETURN PHYSICIAN APPOINTMENT: 17     INITIAL ASSESSMENT:  Mr. Kelsie Abarca presents with decreased ROM/strength L shoulder with increased pain leading to decreased functional status s/p L shoulder surgery 03-15-17. Pt would benefit from skilled physical therapy services to address the above deficits and help patient return to prior level of function. PROBLEM LIST (Impacting functional limitations):  1. Decreased Strength  2. Decreased ADL/Functional Activities  3. Increased Pain  4. Decreased Flexibility/Joint Mobility  5. Decreased [place] with Home Exercise Program INTERVENTIONS PLANNED:  1. Cold  2. Cryotherapy  3. Electrical Stimulation  4. Home Exercise Program (HEP)  5. Manual Therapy  6. Range of Motion (ROM)  7. Therapeutic Exercise/Strengthening   TREATMENT PLAN:  Effective Dates: 17 TO 17. Frequency/Duration: 2 times a week for 3 months  GOALS: (Goals have been discussed and agreed upon with patient.)  Short-Term Functional Goals: Time Frame: 4 weeks  1. Pt will increase PROM L shoulder flexion = 90 degrees, abduction = 90 degrees, external rotation = 70 degrees to assist with self care ADL's  2.  Pt will be independent with HEP  Discharge Goals: Time Frame: 12 weeks  1. Pt will increase ROM L shoulder flexion = 160 degrees, abduction = 160 degrees, external rotation = 80 degrees, internal rotation = 70 degrees to assist with self care and lawn activities  2. Pt will increase strength L shoulder 4/5 or greater to assist with self care and lawn activities  3. Pt will be independent with HEP  4. Pt will perform 20 minutes household and lawn activities independently with min to no c/o L shoulder pain  Rehabilitation Potential For Stated Goals: Good  Regarding Bela Martinez S Shahnaz's therapy, I certify that the treatment plan above will be carried out by a therapist or under their direction. Thank you for this referral,  Rin Chakraborty PT     Referring Physician Signature: Bette Marin MD              Date                    The information in this section was collected on 03-22-17 (except where otherwise noted). HISTORY:   4/10/2017:  Pt states his shoulder is feeling well and he is having no pain at the moment. History of Present Injury/Illness (Reason for Referral):  Pt reports insidious onset L shoulder pain of 6-8 months duration. He had an MRI which revealed a rotator cuff tear and surgery was scheduled. Pt had arthroscopic rotator cuff repair, arthroscopic distal clavicle excision (Benoit procedure), arthroscopic labral debridement and arthroscopic subacromial decompression, left shoulder on 03-15-17. He rates his current L shoulder pain 1/10 sitting at rest, increasing to 4/10 at worst over past couple of days. He is R-handed. Pt works as an . He is currently working part time/modified duty due to his L shoulder surgery. Pt is taking oxycodone 5 mg prn for pain. He took some pain meds prior to his PT session today, but had not taken any for about 3 days prior to today. He is currently having difficulties with all self care and household/lawn activities due to his L shoulder surgery.   Past Medical History/Comorbidities:   Mr. Connie Gustafson  has a past medical history of Arrhythmia; Enlarged prostate; H/O seasonal allergies; Hypercholesteremia; and Rotator cuff tear, left. Mr. Connie Gustafson  has a past surgical history that includes cervical diskectomy (05/2000); urological (1979); vasectomy (1970's); and colonoscopy. Social History/Living Environment:     Pt lives with wife who assists with ADL's as needed  Prior Level of Function/Work/Activity:  Independent with all work and household/lawn activities due to his L shoulder surgery  Dominant Side:         RIGHT  Other Clinical Tests:          MRI  Previous Treatment Approaches:          Surgery 03-15-17  Personal Factors:          Age:  67 y.o. Profession:  Pt works as an   Current Medications:       Current Outpatient Prescriptions:     finasteride (PROSCAR) 5 mg tablet, Take 5 mg by mouth every morning., Disp: , Rfl:     montelukast (SINGULAIR) 10 mg tablet, Take 10 mg by mouth nightly., Disp: , Rfl:     aspirin delayed-release 81 mg tablet, Take 81 mg by mouth every morning., Disp: , Rfl:     atorvastatin (LIPITOR) 40 mg tablet, Take 40 mg by mouth every morning., Disp: , Rfl:     Azelastine (ASTEPRO) 0.15 % (205.5 mcg) nasal spray, 2 Sprays by Both Nostrils route DIALYSIS PRN., Disp: , Rfl:     cyanocobalamin 1,000 mcg tablet, Take 1,000 mcg by mouth daily. , Disp: , Rfl:     Omega-3-DHA-EPA-Fish Oil (FISH OIL) 1,000 mg (120 mg-180 mg) cap, Take  by mouth two (2) times a day. Holding for suregey, Disp: , Rfl:     dronedarone (MULTAQ) tab tablet, Take 400 mg by mouth two (2) times daily (with meals). Indications: PAROXYSMAL ATRIAL FIBRILLATION, Disp: , Rfl:     multivitamin (ONE A DAY) tablet, Take 1 Tab by mouth daily. Holding till surgery, Disp: , Rfl:     silodosin (RAPAFLO) 4 mg capsule, Take 4 mg by mouth nightly., Disp: , Rfl:     sildenafil citrate (VIAGRA) 100 mg tablet, Take 100 mg by mouth as needed. , Disp: , Rfl:    cholecalciferol, vitamin D3, (VITAMIN D3) 2,000 unit tab, Take  by mouth. Holding for surgery, Disp: , Rfl:    Date Last Reviewed:  03-22-17   Number of Personal Factors/Comorbidities that affect the Plan of Care: 1-2: MODERATE COMPLEXITY   EXAMINATION:   Observation/Orthostatic Postural Assessment: Forward head, rounded shoulders; L UE in sling  Palpation:          Generalized tenderness L shoulder  ROM: (PROM)    L shoulder flexion = 70 degrees  L shoulder abduction = 45 degrees  L shoulder external rotation = 30 degrees    Strength:      Not assessed    Special Tests:      Not assessed    Neurological Screen:        Sensation: Intact to light touch L UE  Functional Mobility:         Pt able to transition sit to supine and supine to sit independently    Body Structures Involved:  1. Bones  2. Joints  3. Muscles Body Functions Affected:  1. Sensory/Pain  2. Neuromusculoskeletal Activities and Participation Affected:  1. Mobility  2. Self Care  3. Domestic Life   Number of elements (examined above) that affect the Plan of Care: 3: MODERATE COMPLEXITY   CLINICAL PRESENTATION:   Presentation: Evolving clinical presentation with changing clinical characteristics: MODERATE COMPLEXITY   CLINICAL DECISION MAKING:   Outcome Measure: Tool Used: Disabilities of the Arm, Shoulder and Hand (DASH) Questionnaire - Quick Version  Score:  Initial: 45/55  Most Recent: X/55 (Date: -- )   Interpretation of Score: The DASH is designed to measure the activities of daily living in person's with upper extremity dysfunction or pain. Each section is scored on a 1-5 scale, 5 representing the greatest disability. The scores of each section are added together for a total score of 55. Medical Necessity:   · Patient is expected to demonstrate progress in strength, range of motion and functional technique to increase independence with self care, household and lawn activities.   · Patient demonstrates good rehab potential due to higher previous functional level. Reason for Services/Other Comments:  · Patient continues to require skilled intervention due to decreased ROM/strength L shoulder with increased pain leading to decreased functional status. Use of outcome tool(s) and clinical judgement create a POC that gives a: Questionable prediction of patient's progress: MODERATE COMPLEXITY            TREATMENT:   (In addition to Assessment/Re-Assessment sessions the following treatments were rendered)  Pre-treatment Symptoms/Complaints:  L shoulder pain, decreased ROM and strength  Pain: Initial:     1/10 Post Session:  1/10     THERAPEUTIC EXERCISE: (15 minutes):  Exercises per grid below to improve mobility and strength. Required minimal verbal cues to promote proper body alignment and promote proper body posture. Progressed resistance, range and repetitions as indicated. MANUAL THERAPY: (15 minutes): PROM to L shoulder into flexion, abduction and external rotation was utilized and necessary because of the patient's restricted joint motion and restricted motion of soft tissue. MODALITIES: (10 minutes):      Game Ready Vasopneumatic Compression with ice to L shoulder x10 minutes to decrease pain and inflammation. Skin clear afterwards. Date:  4-10-17   Activity/Exercise Parameters   AROM L wrist Flex/Ext and RD/UD 20 reps each   AAROM L Forearm Pron/Sup 20 reps each   AAROM L Elbow Flex/Ext 20 reps each   Scapular Squeezes 20 reps   Pendulum Exercises (Circles CW/CCW) 20 reps each   Blue Gripper 50 reps         Treatment/Session Assessment:    · Response to Treatment:  Pt continuing to do well with PROM. He was advised that he can remove abduction pillow from sling on Wednesday but to continue wearing the sling until 6 weeks post-op. · Compliance with Program/Exercises: Will assess as treatment progresses. · Recommendations/Intent for next treatment session: \"Next visit will focus on ROM L shoulder\".   Total Treatment Duration:  40 minutes  PT Patient Time In/Time Out  Time In: 1515  Time Out: [address] Gordon Tellez, PT

## 2017-04-13 ENCOUNTER — HOSPITAL ENCOUNTER (OUTPATIENT)
Dept: PHYSICAL THERAPY | Age: 72
Discharge: HOME | End: 2017-04-13
Payer: COMMERCIAL

## 2017-04-13 PROCEDURE — 97140 MANUAL THERAPY 1/> REGIONS: CPT

## 2017-04-13 PROCEDURE — 97016 VASOPNEUMATIC DEVICE THERAPY: CPT

## 2017-04-13 PROCEDURE — 97110 THERAPEUTIC EXERCISES: CPT

## 2017-04-13 NOTE — PROGRESS NOTES
Clara Padmaja  : [address] [facility] [place] [address], [place] [address].  [phone]   [phone]           OUTPATIENT PHYSICAL THERAPY:Daily Note 2017    ICD-10: Treatment Diagnosis: Unspecified rotator cuff tear or rupture of left shoulder, not specified as traumatic (M75.102); Pain in left shoulder (M25.512)  Precautions/Allergies:   Review of patient's allergies indicates no known allergies. Fall Risk Score: 1 (? 5 = High Risk)  MD Orders: Evaluate and Treat MEDICAL/REFERRING DIAGNOSIS:  Unspecified rotator cuff tear or rupture of left shoulder, not specified as traumatic [M75.102]   DATE OF ONSET: Surgery 03-15-17  REFERRING PHYSICIAN: Mora Zabala MD  RETURN PHYSICIAN APPOINTMENT: 17     INITIAL ASSESSMENT:  Mr. Connie Gustafson presents with decreased ROM/strength L shoulder with increased pain leading to decreased functional status s/p L shoulder surgery 03-15-17. Pt would benefit from skilled physical therapy services to address the above deficits and help patient return to prior level of function. PROBLEM LIST (Impacting functional limitations):  1. Decreased Strength  2. Decreased ADL/Functional Activities  3. Increased Pain  4. Decreased Flexibility/Joint Mobility  5. Decreased [place] with Home Exercise Program INTERVENTIONS PLANNED:  1. Cold  2. Cryotherapy  3. Electrical Stimulation  4. Home Exercise Program (HEP)  5. Manual Therapy  6. Range of Motion (ROM)  7. Therapeutic Exercise/Strengthening   TREATMENT PLAN:  Effective Dates: 17 TO 17. Frequency/Duration: 2 times a week for 3 months  GOALS: (Goals have been discussed and agreed upon with patient.)  Short-Term Functional Goals: Time Frame: 4 weeks  1. Pt will increase PROM L shoulder flexion = 90 degrees, abduction = 90 degrees, external rotation = 70 degrees to assist with self care ADL's  2.  Pt will be independent with HEP  Discharge Goals: Time Frame: 12 weeks  1. Pt will increase ROM L shoulder flexion = 160 degrees, abduction = 160 degrees, external rotation = 80 degrees, internal rotation = 70 degrees to assist with self care and lawn activities  2. Pt will increase strength L shoulder 4/5 or greater to assist with self care and lawn activities  3. Pt will be independent with HEP  4. Pt will perform 20 minutes household and lawn activities independently with min to no c/o L shoulder pain  Rehabilitation Potential For Stated Goals: Good  Regarding Mary Ann Prado S Shahnaz's therapy, I certify that the treatment plan above will be carried out by a therapist or under their direction. Thank you for this referral,  Dale Padilla PTA     Referring Physician Signature: Mary Mack MD              Date                    The information in this section was collected on 03-22-17 (except where otherwise noted). HISTORY:   4/13/2017:  Pt states his shoulder is feeling well and he is having no pain at the moment. History of Present Injury/Illness (Reason for Referral):  Pt reports insidious onset L shoulder pain of 6-8 months duration. He had an MRI which revealed a rotator cuff tear and surgery was scheduled. Pt had arthroscopic rotator cuff repair, arthroscopic distal clavicle excision (Benoit procedure), arthroscopic labral debridement and arthroscopic subacromial decompression, left shoulder on 03-15-17. He rates his current L shoulder pain 1/10 sitting at rest, increasing to 4/10 at worst over past couple of days. He is R-handed. Pt works as an . He is currently working part time/modified duty due to his L shoulder surgery. Pt is taking oxycodone 5 mg prn for pain. He took some pain meds prior to his PT session today, but had not taken any for about 3 days prior to today. He is currently having difficulties with all self care and household/lawn activities due to his L shoulder surgery.   Past Medical History/Comorbidities:   Mr. Hiren Ross  has a past medical history of Arrhythmia; Enlarged prostate; H/O seasonal allergies; Hypercholesteremia; and Rotator cuff tear, left. Mr. Hiren Ross  has a past surgical history that includes cervical diskectomy (05/2000); urological (1979); vasectomy (1970's); and colonoscopy. Social History/Living Environment:     Pt lives with wife who assists with ADL's as needed  Prior Level of Function/Work/Activity:  Independent with all work and household/lawn activities due to his L shoulder surgery  Dominant Side:         RIGHT  Other Clinical Tests:          MRI  Previous Treatment Approaches:          Surgery 03-15-17  Personal Factors:          Age:  67 y.o. Profession:  Pt works as an   Current Medications:       Current Outpatient Prescriptions:     finasteride (PROSCAR) 5 mg tablet, Take 5 mg by mouth every morning., Disp: , Rfl:     montelukast (SINGULAIR) 10 mg tablet, Take 10 mg by mouth nightly., Disp: , Rfl:     aspirin delayed-release 81 mg tablet, Take 81 mg by mouth every morning., Disp: , Rfl:     atorvastatin (LIPITOR) 40 mg tablet, Take 40 mg by mouth every morning., Disp: , Rfl:     Azelastine (ASTEPRO) 0.15 % (205.5 mcg) nasal spray, 2 Sprays by Both Nostrils route DIALYSIS PRN., Disp: , Rfl:     cyanocobalamin 1,000 mcg tablet, Take 1,000 mcg by mouth daily. , Disp: , Rfl:     Omega-3-DHA-EPA-Fish Oil (FISH OIL) 1,000 mg (120 mg-180 mg) cap, Take  by mouth two (2) times a day. Holding for suregey, Disp: , Rfl:     dronedarone (MULTAQ) tab tablet, Take 400 mg by mouth two (2) times daily (with meals). Indications: PAROXYSMAL ATRIAL FIBRILLATION, Disp: , Rfl:     multivitamin (ONE A DAY) tablet, Take 1 Tab by mouth daily. Holding till surgery, Disp: , Rfl:     silodosin (RAPAFLO) 4 mg capsule, Take 4 mg by mouth nightly., Disp: , Rfl:     sildenafil citrate (VIAGRA) 100 mg tablet, Take 100 mg by mouth as needed. , Disp: , Rfl:    cholecalciferol, vitamin D3, (VITAMIN D3) 2,000 unit tab, Take  by mouth. Holding for surgery, Disp: , Rfl:    Date Last Reviewed:  03-22-17   Number of Personal Factors/Comorbidities that affect the Plan of Care: 1-2: MODERATE COMPLEXITY   EXAMINATION:   Observation/Orthostatic Postural Assessment: Forward head, rounded shoulders; L UE in sling  Palpation:          Generalized tenderness L shoulder  ROM: (PROM)    L shoulder flexion = 70 degrees  L shoulder abduction = 45 degrees  L shoulder external rotation = 30 degrees    Strength:      Not assessed    Special Tests:      Not assessed    Neurological Screen:        Sensation: Intact to light touch L UE  Functional Mobility:         Pt able to transition sit to supine and supine to sit independently    Body Structures Involved:  1. Bones  2. Joints  3. Muscles Body Functions Affected:  1. Sensory/Pain  2. Neuromusculoskeletal Activities and Participation Affected:  1. Mobility  2. Self Care  3. Domestic Life   Number of elements (examined above) that affect the Plan of Care: 3: MODERATE COMPLEXITY   CLINICAL PRESENTATION:   Presentation: Evolving clinical presentation with changing clinical characteristics: MODERATE COMPLEXITY   CLINICAL DECISION MAKING:   Outcome Measure: Tool Used: Disabilities of the Arm, Shoulder and Hand (DASH) Questionnaire - Quick Version  Score:  Initial: 45/55  Most Recent: X/55 (Date: -- )   Interpretation of Score: The DASH is designed to measure the activities of daily living in person's with upper extremity dysfunction or pain. Each section is scored on a 1-5 scale, 5 representing the greatest disability. The scores of each section are added together for a total score of 55. Medical Necessity:   · Patient is expected to demonstrate progress in strength, range of motion and functional technique to increase independence with self care, household and lawn activities.   · Patient demonstrates good rehab potential due to higher previous functional level. Reason for Services/Other Comments:  · Patient continues to require skilled intervention due to decreased ROM/strength L shoulder with increased pain leading to decreased functional status. Use of outcome tool(s) and clinical judgement create a POC that gives a: Questionable prediction of patient's progress: MODERATE COMPLEXITY            TREATMENT:   (In addition to Assessment/Re-Assessment sessions the following treatments were rendered)  Pre-treatment Symptoms/Complaints:  L shoulder pain, decreased ROM and strength  Pain: Initial:     1/10 Post Session:  1/10     THERAPEUTIC EXERCISE: (15 minutes):  Exercises per grid below to improve mobility and strength. Required minimal verbal cues to promote proper body alignment and promote proper body posture. Progressed resistance, range and repetitions as indicated. MANUAL THERAPY: (25 minutes): PROM to L shoulder into flexion, abduction and external rotation was utilized and necessary because of the patient's restricted joint motion and restricted motion of soft tissue. MODALITIES: (10 minutes):      Game Ready Vasopneumatic Compression with ice to L shoulder x10 minutes to decrease pain and inflammation. Skin clear afterwards. Date:  4-13-17   Activity/Exercise Parameters   AROM L wrist Flex/Ext and RD/UD 20 reps each   AAROM L Forearm Pron/Sup 20 reps each   AAROM L Elbow Flex/Ext 20 reps each   Scapular Squeezes 20 reps   Pendulum Exercises (Circles CW/CCW) 20 reps each   Blue Gripper 50 reps         Treatment/Session Assessment:    · Response to Treatment:  Pt continuing to do well with PROM. ·   · Compliance with Program/Exercises:Compliant  · Recommendations/Intent for next treatment session: \"Next visit will focus on ROM L shoulder\".   Total Treatment Duration:  40 minutes  PT Patient Time In/Time Out  Time In: 0315  Time Out: [address] [place], PTA

## 2017-04-20 ENCOUNTER — HOSPITAL ENCOUNTER (OUTPATIENT)
Dept: PHYSICAL THERAPY | Age: 72
Discharge: HOME | End: 2017-04-20
Payer: COMMERCIAL

## 2017-04-20 PROCEDURE — 97016 VASOPNEUMATIC DEVICE THERAPY: CPT

## 2017-04-20 PROCEDURE — 97140 MANUAL THERAPY 1/> REGIONS: CPT

## 2017-04-20 PROCEDURE — 97110 THERAPEUTIC EXERCISES: CPT

## 2017-04-20 NOTE — PROGRESS NOTES
Bryon Castillo  : 1945 [facility] at [facility]  [address], [address] [address],[address], [facility] [address]  [phone]   [phone]           OUTPATIENT PHYSICAL THERAPY:Daily Note 2017    ICD-10: Treatment Diagnosis: Unspecified rotator cuff tear or rupture of left shoulder, not specified as traumatic (M75.102); Pain in left shoulder (M25.512)  Precautions/Allergies:   Review of patient's allergies indicates no known allergies. Fall Risk Score: 1 (? 5 = High Risk)  MD Orders: Evaluate and Treat MEDICAL/REFERRING DIAGNOSIS:  Unspecified rotator cuff tear or rupture of left shoulder, not specified as traumatic [M75.102]   DATE OF ONSET: Surgery 03-15-17  REFERRING PHYSICIAN: Anmol Fox MD  RETURN PHYSICIAN APPOINTMENT: 17     INITIAL ASSESSMENT:  Mr. Narciso Menendez presents with decreased ROM/strength L shoulder with increased pain leading to decreased functional status s/p L shoulder surgery 03-15-17. Pt would benefit from skilled physical therapy services to address the above deficits and help patient return to prior level of function. PROBLEM LIST (Impacting functional limitations):  1. Decreased Strength  2. Decreased ADL/Functional Activities  3. Increased Pain  4. Decreased Flexibility/Joint Mobility  5. Decreased [place] with Home Exercise Program INTERVENTIONS PLANNED:  1. Cold  2. Cryotherapy  3. Electrical Stimulation  4. Home Exercise Program (HEP)  5. Manual Therapy  6. Range of Motion (ROM)  7. Therapeutic Exercise/Strengthening   TREATMENT PLAN:  Effective Dates: 17 TO 17. Frequency/Duration: 2 times a week for 3 months  GOALS: (Goals have been discussed and agreed upon with patient.)  Short-Term Functional Goals: Time Frame: 4 weeks  1. Pt will increase PROM L shoulder flexion = 90 degrees, abduction = 90 degrees, external rotation = 70 degrees to assist with self care ADL's  2.  Pt will be independent with HEP  Discharge Goals: Time Frame: 12 weeks  1. Pt will increase ROM L shoulder flexion = 160 degrees, abduction = 160 degrees, external rotation = 80 degrees, internal rotation = 70 degrees to assist with self care and lawn activities  2. Pt will increase strength L shoulder 4/5 or greater to assist with self care and lawn activities  3. Pt will be independent with HEP  4. Pt will perform 20 minutes household and lawn activities independently with min to no c/o L shoulder pain  Rehabilitation Potential For Stated Goals: Good  Regarding Sendy Chino ARBOLEDA Shahnaz's therapy, I certify that the treatment plan above will be carried out by a therapist or under their direction. Thank you for this referral,  Samira Junior PTA     Referring Physician Signature: Sue Matamoros MD              Date                    The information in this section was collected on 03-22-17 (except where otherwise noted). HISTORY:   4/20/2017:  Pt states his shoulder is feeling well and he is having no pain at the moment. History of Present Injury/Illness (Reason for Referral):  Pt reports insidious onset L shoulder pain of 6-8 months duration. He had an MRI which revealed a rotator cuff tear and surgery was scheduled. Pt had arthroscopic rotator cuff repair, arthroscopic distal clavicle excision (Benoit procedure), arthroscopic labral debridement and arthroscopic subacromial decompression, left shoulder on 03-15-17. He rates his current L shoulder pain 1/10 sitting at rest, increasing to 4/10 at worst over past couple of days. He is R-handed. Pt works as an . He is currently working part time/modified duty due to his L shoulder surgery. Pt is taking oxycodone 5 mg prn for pain. He took some pain meds prior to his PT session today, but had not taken any for about 3 days prior to today. He is currently having difficulties with all self care and household/lawn activities due to his L shoulder surgery.   Past Medical History/Comorbidities:   Mr. Deedee Vargas  has a past medical history of Arrhythmia; Enlarged prostate; H/O seasonal allergies; Hypercholesteremia; and Rotator cuff tear, left. Mr. Deedee Vargas  has a past surgical history that includes cervical diskectomy (05/2000); urological (1979); vasectomy (1970's); and colonoscopy. Social History/Living Environment:     Pt lives with wife who assists with ADL's as needed  Prior Level of Function/Work/Activity:  Independent with all work and household/lawn activities due to his L shoulder surgery  Dominant Side:         RIGHT  Other Clinical Tests:          MRI  Previous Treatment Approaches:          Surgery 03-15-17  Personal Factors:          Age:  67 y.o. Profession:  Pt works as an   Current Medications:       Current Outpatient Prescriptions:     finasteride (PROSCAR) 5 mg tablet, Take 5 mg by mouth every morning., Disp: , Rfl:     montelukast (SINGULAIR) 10 mg tablet, Take 10 mg by mouth nightly., Disp: , Rfl:     aspirin delayed-release 81 mg tablet, Take 81 mg by mouth every morning., Disp: , Rfl:     atorvastatin (LIPITOR) 40 mg tablet, Take 40 mg by mouth every morning., Disp: , Rfl:     Azelastine (ASTEPRO) 0.15 % (205.5 mcg) nasal spray, 2 Sprays by Both Nostrils route DIALYSIS PRN., Disp: , Rfl:     cyanocobalamin 1,000 mcg tablet, Take 1,000 mcg by mouth daily. , Disp: , Rfl:     Omega-3-DHA-EPA-Fish Oil (FISH OIL) 1,000 mg (120 mg-180 mg) cap, Take  by mouth two (2) times a day. Holding for suregey, Disp: , Rfl:     dronedarone (MULTAQ) tab tablet, Take 400 mg by mouth two (2) times daily (with meals). Indications: PAROXYSMAL ATRIAL FIBRILLATION, Disp: , Rfl:     multivitamin (ONE A DAY) tablet, Take 1 Tab by mouth daily. Holding till surgery, Disp: , Rfl:     silodosin (RAPAFLO) 4 mg capsule, Take 4 mg by mouth nightly., Disp: , Rfl:     sildenafil citrate (VIAGRA) 100 mg tablet, Take 100 mg by mouth as needed. , Disp: , Rfl:    cholecalciferol, vitamin D3, (VITAMIN D3) 2,000 unit tab, Take  by mouth. Holding for surgery, Disp: , Rfl:    Date Last Reviewed:  03-22-17   Number of Personal Factors/Comorbidities that affect the Plan of Care: 1-2: MODERATE COMPLEXITY   EXAMINATION:   Observation/Orthostatic Postural Assessment: Forward head, rounded shoulders; L UE in sling  Palpation:          Generalized tenderness L shoulder  ROM: (PROM)    L shoulder flexion = 70 degrees  L shoulder abduction = 45 degrees  L shoulder external rotation = 30 degrees    Strength:      Not assessed    Special Tests:      Not assessed    Neurological Screen:        Sensation: Intact to light touch L UE  Functional Mobility:         Pt able to transition sit to supine and supine to sit independently    Body Structures Involved:  1. Bones  2. Joints  3. Muscles Body Functions Affected:  1. Sensory/Pain  2. Neuromusculoskeletal Activities and Participation Affected:  1. Mobility  2. Self Care  3. Domestic Life   Number of elements (examined above) that affect the Plan of Care: 3: MODERATE COMPLEXITY   CLINICAL PRESENTATION:   Presentation: Evolving clinical presentation with changing clinical characteristics: MODERATE COMPLEXITY   CLINICAL DECISION MAKING:   Outcome Measure: Tool Used: Disabilities of the Arm, Shoulder and Hand (DASH) Questionnaire - Quick Version  Score:  Initial: 45/55  Most Recent: X/55 (Date: -- )   Interpretation of Score: The DASH is designed to measure the activities of daily living in person's with upper extremity dysfunction or pain. Each section is scored on a 1-5 scale, 5 representing the greatest disability. The scores of each section are added together for a total score of 55. Medical Necessity:   · Patient is expected to demonstrate progress in strength, range of motion and functional technique to increase independence with self care, household and lawn activities.   · Patient demonstrates good rehab potential due to higher previous functional level. Reason for Services/Other Comments:  · Patient continues to require skilled intervention due to decreased ROM/strength L shoulder with increased pain leading to decreased functional status. Use of outcome tool(s) and clinical judgement create a POC that gives a: Questionable prediction of patient's progress: MODERATE COMPLEXITY            TREATMENT:   (In addition to Assessment/Re-Assessment sessions the following treatments were rendered)  Pre-treatment Symptoms/Complaints:  L shoulder pain, decreased ROM and strength  Pain: Initial:     1/10 Post Session:  1/10     THERAPEUTIC EXERCISE: (15 minutes):  Exercises per grid below to improve mobility and strength. Required no manual cues to promote proper body alignment and promote proper body posture. Progressed resistance, range and repetitions as indicated. MANUAL THERAPY: (20 minutes): PROM to L shoulder into flexion, abduction and external rotation was utilized and necessary because of the patient's restricted joint motion and restricted motion of soft tissue. MODALITIES: (10 minutes):      Game Ready Vasopneumatic Compression with ice to L shoulder x10 minutes to decrease pain and inflammation. Skin clear afterwards. Date:  4-20-17   Activity/Exercise Parameters   AROM L wrist Flex/Ext and RD/UD 20 reps each   AAROM L Forearm Pron/Sup 20 reps each   Isometric's  Flexion-extension-ER-gentle NO IR   Pulley's Next week           AAROM L Elbow Flex/Ext 20 reps each   Scapular Squeezes 20 reps   Pendulum Exercises (Circles CW/CCW) 20 reps each   Blue Gripper 50 reps         Treatment/Session Assessment:    · Response to Treatment:  Pt continuing to do well with PROM. Added pulley's for home. - No IR. 5 weeks out from surgery this week. .  Doing well overall. MD appt next Tuesday. ·   · Compliance with Program/Exercises:Compliant  · Recommendations/Intent for next treatment session:  \"Next visit will focus on ROM L shoulder\".   Total Treatment Duration:  45 minutes  PT Patient Time In/Time Out  Time In: 7185  Time Out: [address] [place], PTA

## 2017-04-24 ENCOUNTER — HOSPITAL ENCOUNTER (OUTPATIENT)
Dept: PHYSICAL THERAPY | Age: 72
Discharge: HOME | End: 2017-04-24
Payer: COMMERCIAL

## 2017-04-24 PROCEDURE — 97140 MANUAL THERAPY 1/> REGIONS: CPT

## 2017-04-24 PROCEDURE — 97016 VASOPNEUMATIC DEVICE THERAPY: CPT

## 2017-04-24 PROCEDURE — 97110 THERAPEUTIC EXERCISES: CPT

## 2017-04-24 NOTE — PROGRESS NOTES
Gely Pedroza  : 1945 [facility] at [address], [address] [place] [address],[address], [facility] [address].  [phone]   [phone]           OUTPATIENT PHYSICAL THERAPY:Daily Note 2017    ICD-10: Treatment Diagnosis: Unspecified rotator cuff tear or rupture of left shoulder, not specified as traumatic (M75.102); Pain in left shoulder (M25.512)  Precautions/Allergies:   Review of patient's allergies indicates no known allergies. Fall Risk Score: 1 (? 5 = High Risk)  MD Orders: Evaluate and Treat MEDICAL/REFERRING DIAGNOSIS:  Unspecified rotator cuff tear or rupture of left shoulder, not specified as traumatic [M75.102]   DATE OF ONSET: Surgery 03-15-17  REFERRING PHYSICIAN: Amy Barlow MD  RETURN PHYSICIAN APPOINTMENT: 17     INITIAL ASSESSMENT:  Mr. Laureano Arriaga presents with decreased ROM/strength L shoulder with increased pain leading to decreased functional status s/p L shoulder surgery 03-15-17. Pt would benefit from skilled physical therapy services to address the above deficits and help patient return to prior level of function. PROBLEM LIST (Impacting functional limitations):  1. Decreased Strength  2. Decreased ADL/Functional Activities  3. Increased Pain  4. Decreased Flexibility/Joint Mobility  5. Decreased [place] with Home Exercise Program INTERVENTIONS PLANNED:  1. Cold  2. Cryotherapy  3. Electrical Stimulation  4. Home Exercise Program (HEP)  5. Manual Therapy  6. Range of Motion (ROM)  7. Therapeutic Exercise/Strengthening   TREATMENT PLAN:  Effective Dates: 17 TO 17. Frequency/Duration: 2 times a week for 3 months  GOALS: (Goals have been discussed and agreed upon with patient.)  Short-Term Functional Goals: Time Frame: 4 weeks  1. Pt will increase PROM L shoulder flexion = 90 degrees, abduction = 90 degrees, external rotation = 70 degrees to assist with self care ADL's  2.  Pt will be independent with HEP  Discharge Goals: Time Frame: 12 weeks  1. Pt will increase ROM L shoulder flexion = 160 degrees, abduction = 160 degrees, external rotation = 80 degrees, internal rotation = 70 degrees to assist with self care and lawn activities  2. Pt will increase strength L shoulder 4/5 or greater to assist with self care and lawn activities  3. Pt will be independent with HEP  4. Pt will perform 20 minutes household and lawn activities independently with min to no c/o L shoulder pain  Rehabilitation Potential For Stated Goals: Good  Regarding Gopal [place] ROBLES Christie's therapy, I certify that the treatment plan above will be carried out by a therapist or under their direction. Thank you for this referral,  Viktor Avila PTA     Referring Physician Signature: Chriss Groves MD              Date                    The information in this section was collected on 03-22-17 (except where otherwise noted). HISTORY:   4/24/2017:  Pt states his shoulder is feeling well and he is having no pain at the moment. History of Present Injury/Illness (Reason for Referral):  Pt reports insidious onset L shoulder pain of 6-8 months duration. He had an MRI which revealed a rotator cuff tear and surgery was scheduled. Pt had arthroscopic rotator cuff repair, arthroscopic distal clavicle excision (Benoit procedure), arthroscopic labral debridement and arthroscopic subacromial decompression, left shoulder on 03-15-17. He rates his current L shoulder pain 1/10 sitting at rest, increasing to 4/10 at worst over past couple of days. He is R-handed. Pt works as an . He is currently working part time/modified duty due to his L shoulder surgery. Pt is taking oxycodone 5 mg prn for pain. He took some pain meds prior to his PT session today, but had not taken any for about 3 days prior to today. He is currently having difficulties with all self care and household/lawn activities due to his L shoulder surgery.   Past Medical History/Comorbidities:   Mr. Connie Gustafson  has a past medical history of Arrhythmia; Enlarged prostate; H/O seasonal allergies; Hypercholesteremia; and Rotator cuff tear, left. Mr. Connie Gustafson  has a past surgical history that includes cervical diskectomy (05/2000); urological (1979); vasectomy (1970's); and colonoscopy. Social History/Living Environment:     Pt lives with wife who assists with ADL's as needed  Prior Level of Function/Work/Activity:  Independent with all work and household/lawn activities due to his L shoulder surgery  Dominant Side:         RIGHT  Other Clinical Tests:          MRI  Previous Treatment Approaches:          Surgery 03-15-17  Personal Factors:          Age:  67 y.o. Profession:  Pt works as an   Current Medications:       Current Outpatient Prescriptions:     finasteride (PROSCAR) 5 mg tablet, Take 5 mg by mouth every morning., Disp: , Rfl:     montelukast (SINGULAIR) 10 mg tablet, Take 10 mg by mouth nightly., Disp: , Rfl:     aspirin delayed-release 81 mg tablet, Take 81 mg by mouth every morning., Disp: , Rfl:     atorvastatin (LIPITOR) 40 mg tablet, Take 40 mg by mouth every morning., Disp: , Rfl:     Azelastine (ASTEPRO) 0.15 % (205.5 mcg) nasal spray, 2 Sprays by Both Nostrils route DIALYSIS PRN., Disp: , Rfl:     cyanocobalamin 1,000 mcg tablet, Take 1,000 mcg by mouth daily. , Disp: , Rfl:     Omega-3-DHA-EPA-Fish Oil (FISH OIL) 1,000 mg (120 mg-180 mg) cap, Take  by mouth two (2) times a day. Holding for suregey, Disp: , Rfl:     dronedarone (MULTAQ) tab tablet, Take 400 mg by mouth two (2) times daily (with meals). Indications: PAROXYSMAL ATRIAL FIBRILLATION, Disp: , Rfl:     multivitamin (ONE A DAY) tablet, Take 1 Tab by mouth daily. Holding till surgery, Disp: , Rfl:     silodosin (RAPAFLO) 4 mg capsule, Take 4 mg by mouth nightly., Disp: , Rfl:     sildenafil citrate (VIAGRA) 100 mg tablet, Take 100 mg by mouth as needed. , Disp: , Rfl:    cholecalciferol, vitamin D3, (VITAMIN D3) 2,000 unit tab, Take  by mouth. Holding for surgery, Disp: , Rfl:    Date Last Reviewed:  03-22-17   Number of Personal Factors/Comorbidities that affect the Plan of Care: 1-2: MODERATE COMPLEXITY   EXAMINATION:   Observation/Orthostatic Postural Assessment: Forward head, rounded shoulders; L UE in sling  Palpation:          Generalized tenderness L shoulder  ROM: (PROM)    L shoulder flexion = 70 degrees  L shoulder abduction = 45 degrees  L shoulder external rotation = 30 degrees    Strength:      Not assessed    Special Tests:      Not assessed    Neurological Screen:        Sensation: Intact to light touch L UE  Functional Mobility:         Pt able to transition sit to supine and supine to sit independently    Body Structures Involved:  1. Bones  2. Joints  3. Muscles Body Functions Affected:  1. Sensory/Pain  2. Neuromusculoskeletal Activities and Participation Affected:  1. Mobility  2. Self Care  3. Domestic Life   Number of elements (examined above) that affect the Plan of Care: 3: MODERATE COMPLEXITY   CLINICAL PRESENTATION:   Presentation: Evolving clinical presentation with changing clinical characteristics: MODERATE COMPLEXITY   CLINICAL DECISION MAKING:   Outcome Measure: Tool Used: Disabilities of the Arm, Shoulder and Hand (DASH) Questionnaire - Quick Version  Score:  Initial: 45/55  Most Recent: X/55 (Date: -- )   Interpretation of Score: The DASH is designed to measure the activities of daily living in person's with upper extremity dysfunction or pain. Each section is scored on a 1-5 scale, 5 representing the greatest disability. The scores of each section are added together for a total score of 55. Medical Necessity:   · Patient is expected to demonstrate progress in strength, range of motion and functional technique to increase independence with self care, household and lawn activities.   · Patient demonstrates good rehab potential due to higher previous functional level. Reason for Services/Other Comments:  · Patient continues to require skilled intervention due to decreased ROM/strength L shoulder with increased pain leading to decreased functional status. Use of outcome tool(s) and clinical judgement create a POC that gives a: Questionable prediction of patient's progress: MODERATE COMPLEXITY            TREATMENT:   (In addition to Assessment/Re-Assessment sessions the following treatments were rendered)  Pre-treatment Symptoms/Complaints:  I am doing ok, I see the MD tomorrow. Pain: Initial:     1/10 Minimal Post Session:  1/10     THERAPEUTIC EXERCISE: (20 minutes):  Exercises per grid below to improve mobility and strength. Required minimal manual cues to promote proper body alignment and promote proper body posture. Progressed resistance, range and repetitions as indicated. MANUAL THERAPY: (20 minutes): PROM to L shoulder into flexion, abduction and external rotation was utilized and necessary because of the patient's restricted joint motion and restricted motion of soft tissue. MODALITIES: (10 minutes):      Game Ready Vasopneumatic Compression with ice to L shoulder x10 minutes to decrease pain and inflammation. Skin clear afterwards. Date:  4-20-17   Activity/Exercise Parameters   AROM L wrist Flex/Ext and RD/UD HEP   AAROM L Forearm Pron/Sup HEP   Isometric's  Flexion-extension-ER-gentle NO IR   Pulley's Flexion and Scaption x 20 each    UBE X 6 min Level 1.0 fwd-bwds   Wall ladder X 5 reps each    AAROM L Elbow Flex/Ext HEP   Scapular Squeezes-  Extension Tband 20 reps-Green   Pendulum Exercises (Circles CW/CCW) 20 reps each   Wand in supine into flexion X 15 reps         Treatment/Session Assessment:    · Response to Treatment:  Pt continuing to do well with PROM. Added a few more exercises on today-no pain from added exercises. Doing well overall. MD appt Tuesday.   ·   · Compliance with Program/Exercises:Compliant  · Recommendations/Intent for next treatment session: \"Next visit will focus on ROM L shoulder\".   Total Treatment Duration:  50 minutes  PT Patient Time In/Time Out  Time In: 0315  Time Out: 0410    Samira Junior, CHARAN

## 2017-04-27 ENCOUNTER — HOSPITAL ENCOUNTER (OUTPATIENT)
Dept: PHYSICAL THERAPY | Age: 72
Discharge: HOME | End: 2017-04-27
Payer: COMMERCIAL

## 2017-04-27 PROCEDURE — 97110 THERAPEUTIC EXERCISES: CPT

## 2017-04-27 PROCEDURE — 97016 VASOPNEUMATIC DEVICE THERAPY: CPT

## 2017-04-27 PROCEDURE — 97140 MANUAL THERAPY 1/> REGIONS: CPT

## 2017-04-27 NOTE — PROGRESS NOTES
Kishore Estrada  : 1945 [facility] [address], [address] [place] [address],[address], [address]  [phone]   [phone]           OUTPATIENT PHYSICAL THERAPY:Daily Note 2017    ICD-10: Treatment Diagnosis: Unspecified rotator cuff tear or rupture of left shoulder, not specified as traumatic (M75.102); Pain in left shoulder (M25.512)  Precautions/Allergies:   Review of patient's allergies indicates no known allergies. Fall Risk Score: 1 (? 5 = High Risk)  MD Orders: Evaluate and Treat MEDICAL/REFERRING DIAGNOSIS:  Unspecified rotator cuff tear or rupture of left shoulder, not specified as traumatic [M75.102]   DATE OF ONSET: Surgery 03-15-17  REFERRING PHYSICIAN: Ney Kitchen MD  RETURN PHYSICIAN APPOINTMENT: 17     INITIAL ASSESSMENT:  Mr. Delene Nissen presents with decreased ROM/strength L shoulder with increased pain leading to decreased functional status s/p L shoulder surgery 03-15-17. Pt would benefit from skilled physical therapy services to address the above deficits and help patient return to prior level of function. PROBLEM LIST (Impacting functional limitations):  1. Decreased Strength  2. Decreased ADL/Functional Activities  3. Increased Pain  4. Decreased Flexibility/Joint Mobility  5. Decreased [place] with Home Exercise Program INTERVENTIONS PLANNED:  1. Cold  2. Cryotherapy  3. Electrical Stimulation  4. Home Exercise Program (HEP)  5. Manual Therapy  6. Range of Motion (ROM)  7. Therapeutic Exercise/Strengthening   TREATMENT PLAN:  Effective Dates: 17 TO 17. Frequency/Duration: 2 times a week for 3 months  GOALS: (Goals have been discussed and agreed upon with patient.)  Short-Term Functional Goals: Time Frame: 4 weeks  1. Pt will increase PROM L shoulder flexion = 90 degrees, abduction = 90 degrees, external rotation = 70 degrees to assist with self care ADL's  2.  Pt will be independent with HEP  Discharge Goals: Time Frame: 12 weeks  1. Pt will increase ROM L shoulder flexion = 160 degrees, abduction = 160 degrees, external rotation = 80 degrees, internal rotation = 70 degrees to assist with self care and lawn activities  2. Pt will increase strength L shoulder 4/5 or greater to assist with self care and lawn activities  3. Pt will be independent with HEP  4. Pt will perform 20 minutes household and lawn activities independently with min to no c/o L shoulder pain  Rehabilitation Potential For Stated Goals: Good  Regarding Maury Christie's therapy, I certify that the treatment plan above will be carried out by a therapist or under their direction. Thank you for this referral,  Giovani Olivier PTA     Referring Physician Signature: Real Barth MD              Date                    The information in this section was collected on 03-22-17 (except where otherwise noted). HISTORY:   4/27/2017:  Pt states his shoulder is feeling well and he is having no pain at the moment. History of Present Injury/Illness (Reason for Referral):  Pt reports insidious onset L shoulder pain of 6-8 months duration. He had an MRI which revealed a rotator cuff tear and surgery was scheduled. Pt had arthroscopic rotator cuff repair, arthroscopic distal clavicle excision (Benoit procedure), arthroscopic labral debridement and arthroscopic subacromial decompression, left shoulder on 03-15-17. He rates his current L shoulder pain 1/10 sitting at rest, increasing to 4/10 at worst over past couple of days. He is R-handed. Pt works as an . He is currently working part time/modified duty due to his L shoulder surgery. Pt is taking oxycodone 5 mg prn for pain. He took some pain meds prior to his PT session today, but had not taken any for about 3 days prior to today. He is currently having difficulties with all self care and household/lawn activities due to his L shoulder surgery.   Past Medical History/Comorbidities:   Mr. Isai Ramos  has a past medical history of Arrhythmia; Enlarged prostate; H/O seasonal allergies; Hypercholesteremia; and Rotator cuff tear, left. Mr. Isai Ramos  has a past surgical history that includes cervical diskectomy (05/2000); urological (1979); vasectomy (1970's); and colonoscopy. Social History/Living Environment:     Pt lives with wife who assists with ADL's as needed  Prior Level of Function/Work/Activity:  Independent with all work and household/lawn activities due to his L shoulder surgery  Dominant Side:         RIGHT  Other Clinical Tests:          MRI  Previous Treatment Approaches:          Surgery 03-15-17  Personal Factors:          Age:  67 y.o. Profession:  Pt works as an   Current Medications:       Current Outpatient Prescriptions:     finasteride (PROSCAR) 5 mg tablet, Take 5 mg by mouth every morning., Disp: , Rfl:     montelukast (SINGULAIR) 10 mg tablet, Take 10 mg by mouth nightly., Disp: , Rfl:     aspirin delayed-release 81 mg tablet, Take 81 mg by mouth every morning., Disp: , Rfl:     atorvastatin (LIPITOR) 40 mg tablet, Take 40 mg by mouth every morning., Disp: , Rfl:     Azelastine (ASTEPRO) 0.15 % (205.5 mcg) nasal spray, 2 Sprays by Both Nostrils route DIALYSIS PRN., Disp: , Rfl:     cyanocobalamin 1,000 mcg tablet, Take 1,000 mcg by mouth daily. , Disp: , Rfl:     Omega-3-DHA-EPA-Fish Oil (FISH OIL) 1,000 mg (120 mg-180 mg) cap, Take  by mouth two (2) times a day. Holding for suregey, Disp: , Rfl:     dronedarone (MULTAQ) tab tablet, Take 400 mg by mouth two (2) times daily (with meals). Indications: PAROXYSMAL ATRIAL FIBRILLATION, Disp: , Rfl:     multivitamin (ONE A DAY) tablet, Take 1 Tab by mouth daily. Holding till surgery, Disp: , Rfl:     silodosin (RAPAFLO) 4 mg capsule, Take 4 mg by mouth nightly., Disp: , Rfl:     sildenafil citrate (VIAGRA) 100 mg tablet, Take 100 mg by mouth as needed. , Disp: , Rfl:    cholecalciferol, vitamin D3, (VITAMIN D3) 2,000 unit tab, Take  by mouth. Holding for surgery, Disp: , Rfl:    Date Last Reviewed:  03-22-17   Number of Personal Factors/Comorbidities that affect the Plan of Care: 1-2: MODERATE COMPLEXITY   EXAMINATION:   Observation/Orthostatic Postural Assessment: Forward head, rounded shoulders; L UE in sling  Palpation:          Generalized tenderness L shoulder  ROM: (PROM)    L shoulder flexion = 70 degrees  L shoulder abduction = 45 degrees  L shoulder external rotation = 30 degrees    Strength:      Not assessed    Special Tests:      Not assessed    Neurological Screen:        Sensation: Intact to light touch L UE  Functional Mobility:         Pt able to transition sit to supine and supine to sit independently    Body Structures Involved:  1. Bones  2. Joints  3. Muscles Body Functions Affected:  1. Sensory/Pain  2. Neuromusculoskeletal Activities and Participation Affected:  1. Mobility  2. Self Care  3. Domestic Life   Number of elements (examined above) that affect the Plan of Care: 3: MODERATE COMPLEXITY   CLINICAL PRESENTATION:   Presentation: Evolving clinical presentation with changing clinical characteristics: MODERATE COMPLEXITY   CLINICAL DECISION MAKING:   Outcome Measure: Tool Used: Disabilities of the Arm, Shoulder and Hand (DASH) Questionnaire - Quick Version  Score:  Initial: 45/55  Most Recent: X/55 (Date: -- )   Interpretation of Score: The DASH is designed to measure the activities of daily living in person's with upper extremity dysfunction or pain. Each section is scored on a 1-5 scale, 5 representing the greatest disability. The scores of each section are added together for a total score of 55. Medical Necessity:   · Patient is expected to demonstrate progress in strength, range of motion and functional technique to increase independence with self care, household and lawn activities.   · Patient demonstrates good rehab potential due to higher previous functional level. Reason for Services/Other Comments:  · Patient continues to require skilled intervention due to decreased ROM/strength L shoulder with increased pain leading to decreased functional status. Use of outcome tool(s) and clinical judgement create a POC that gives a: Questionable prediction of patient's progress: MODERATE COMPLEXITY            TREATMENT:   (In addition to Assessment/Re-Assessment sessions the following treatments were rendered)  Pre-treatment Symptoms/Complaints: The MD was happy with my shoulder   Pain: Initial:     1/10 Minimal Post Session:  1/10     THERAPEUTIC EXERCISE: (30 minutes):  Exercises per grid below to improve mobility and strength. Required minimal manual cues to promote proper body alignment and promote proper body posture. Progressed resistance, range and repetitions as indicated. MANUAL THERAPY: (10 minutes): PROM to L shoulder into flexion, abduction and external rotation was utilized and necessary because of the patient's restricted joint motion and restricted motion of soft tissue. MODALITIES: (10 minutes):      Game Ready Vasopneumatic Compression with ice to L shoulder x10 minutes to decrease pain and inflammation. Skin clear afterwards. Date:  4-27-17   Activity/Exercise Parameters   S/L ER #1 x 15 reps   Supine IR ER #1 x 15 reps each   Isometric's  Flexion-extension-ER-gentle NO IR   IR ER tband Red x 15 reps each   Pulley's Flexion and Scaption x 20 each    Prone flexion-extension-scaption X 12 each way   S/L abduction X 15 reps   UBE X 6 min Level 1.0 fwd-bwds   Wall ladder X 5 reps each    Bicep curl #5 x 15 reps   Scapular Squeezes-  Extension Tband 20 reps-Green   Pendulum Exercises (Circles CW/CCW) 20 reps each   Wand in supine into flexion X 15 reps         Treatment/Session Assessment:    · Response to Treatment:  Patient got a good report from MD.   Anatoliy Jaramilloin him to use it more, but no heavy lifting.    Patient doing well overall. Continue ROM and light strengthening. ·   · Compliance with Program/Exercises:Compliant  · Recommendations/Intent for next treatment session: \"Next visit will focus on ROM L shoulder\".   Total Treatment Duration:  50 minutes  PT Patient Time In/Time Out  Time In: 0315  Time Out: 0405    Mayra Sam PTA

## 2017-05-01 ENCOUNTER — HOSPITAL ENCOUNTER (OUTPATIENT)
Dept: PHYSICAL THERAPY | Age: 72
Discharge: HOME | End: 2017-05-01
Payer: COMMERCIAL

## 2017-05-01 PROCEDURE — 97140 MANUAL THERAPY 1/> REGIONS: CPT

## 2017-05-01 PROCEDURE — 97110 THERAPEUTIC EXERCISES: CPT

## 2017-05-01 PROCEDURE — 97016 VASOPNEUMATIC DEVICE THERAPY: CPT

## 2017-05-01 NOTE — PROGRESS NOTES
Reid Mc  : 1945 [facility] [place] [address],[address], [place] [address].  [phone]   [phone]           OUTPATIENT PHYSICAL THERAPY:Daily Note 2017    ICD-10: Treatment Diagnosis: Unspecified rotator cuff tear or rupture of left shoulder, not specified as traumatic (M75.102); Pain in left shoulder (M25.512)  Precautions/Allergies:   Review of patient's allergies indicates no known allergies. Fall Risk Score: 1 (? 5 = High Risk)  MD Orders: Evaluate and Treat MEDICAL/REFERRING DIAGNOSIS:  Unspecified rotator cuff tear or rupture of left shoulder, not specified as traumatic [M75.102]   DATE OF ONSET: Surgery 03-15-17  REFERRING PHYSICIAN: Noe Lazaro MD  RETURN PHYSICIAN APPOINTMENT: 17     INITIAL ASSESSMENT:  Mr. Isaias Gomez presents with decreased ROM/strength L shoulder with increased pain leading to decreased functional status s/p L shoulder surgery 03-15-17. Pt would benefit from skilled physical therapy services to address the above deficits and help patient return to prior level of function. PROBLEM LIST (Impacting functional limitations):  1. Decreased Strength  2. Decreased ADL/Functional Activities  3. Increased Pain  4. Decreased Flexibility/Joint Mobility  5. Decreased [place] with Home Exercise Program INTERVENTIONS PLANNED:  1. Cold  2. Cryotherapy  3. Electrical Stimulation  4. Home Exercise Program (HEP)  5. Manual Therapy  6. Range of Motion (ROM)  7. Therapeutic Exercise/Strengthening   TREATMENT PLAN:  Effective Dates: 17 TO 17. Frequency/Duration: 2 times a week for 3 months  GOALS: (Goals have been discussed and agreed upon with patient.)  Short-Term Functional Goals: Time Frame: 4 weeks  1. Pt will increase PROM L shoulder flexion = 90 degrees, abduction = 90 degrees, external rotation = 70 degrees to assist with self care ADL's  2.  Pt will be independent with HEP  Discharge Goals: Time Frame: 12 weeks  1. Pt will increase ROM L shoulder flexion = 160 degrees, abduction = 160 degrees, external rotation = 80 degrees, internal rotation = 70 degrees to assist with self care and lawn activities  2. Pt will increase strength L shoulder 4/5 or greater to assist with self care and lawn activities  3. Pt will be independent with HEP  4. Pt will perform 20 minutes household and lawn activities independently with min to no c/o L shoulder pain  Rehabilitation Potential For Stated Goals: Good  Regarding Suzanne Christie's therapy, I certify that the treatment plan above will be carried out by a therapist or under their direction. Thank you for this referral,  Federica Chahal PTA     Referring Physician Signature: Nicolasa Kimble MD              Date                    The information in this section was collected on 03-22-17 (except where otherwise noted). HISTORY:   5/1/2017:  Pt states his shoulder is feeling well and he is having no pain at the moment. History of Present Injury/Illness (Reason for Referral):  Pt reports insidious onset L shoulder pain of 6-8 months duration. He had an MRI which revealed a rotator cuff tear and surgery was scheduled. Pt had arthroscopic rotator cuff repair, arthroscopic distal clavicle excision (Benoit procedure), arthroscopic labral debridement and arthroscopic subacromial decompression, left shoulder on 03-15-17. He rates his current L shoulder pain 1/10 sitting at rest, increasing to 4/10 at worst over past couple of days. He is R-handed. Pt works as an . He is currently working part time/modified duty due to his L shoulder surgery. Pt is taking oxycodone 5 mg prn for pain. He took some pain meds prior to his PT session today, but had not taken any for about 3 days prior to today. He is currently having difficulties with all self care and household/lawn activities due to his L shoulder surgery.   Past Medical History/Comorbidities:   Mr. Chicho Gallo  has a past medical history of Arrhythmia; Enlarged prostate; H/O seasonal allergies; Hypercholesteremia; and Rotator cuff tear, left. Mr. Chicho Gallo  has a past surgical history that includes cervical diskectomy (05/2000); urological (1979); vasectomy (1970's); and colonoscopy. Social History/Living Environment:     Pt lives with wife who assists with ADL's as needed  Prior Level of Function/Work/Activity:  Independent with all work and household/lawn activities due to his L shoulder surgery  Dominant Side:         RIGHT  Other Clinical Tests:          MRI  Previous Treatment Approaches:          Surgery 03-15-17  Personal Factors:          Age:  67 y.o. Profession:  Pt works as an   Current Medications:       Current Outpatient Prescriptions:     finasteride (PROSCAR) 5 mg tablet, Take 5 mg by mouth every morning., Disp: , Rfl:     montelukast (SINGULAIR) 10 mg tablet, Take 10 mg by mouth nightly., Disp: , Rfl:     aspirin delayed-release 81 mg tablet, Take 81 mg by mouth every morning., Disp: , Rfl:     atorvastatin (LIPITOR) 40 mg tablet, Take 40 mg by mouth every morning., Disp: , Rfl:     Azelastine (ASTEPRO) 0.15 % (205.5 mcg) nasal spray, 2 Sprays by Both Nostrils route DIALYSIS PRN., Disp: , Rfl:     cyanocobalamin 1,000 mcg tablet, Take 1,000 mcg by mouth daily. , Disp: , Rfl:     Omega-3-DHA-EPA-Fish Oil (FISH OIL) 1,000 mg (120 mg-180 mg) cap, Take  by mouth two (2) times a day. Holding for suregey, Disp: , Rfl:     dronedarone (MULTAQ) tab tablet, Take 400 mg by mouth two (2) times daily (with meals). Indications: PAROXYSMAL ATRIAL FIBRILLATION, Disp: , Rfl:     multivitamin (ONE A DAY) tablet, Take 1 Tab by mouth daily. Holding till surgery, Disp: , Rfl:     silodosin (RAPAFLO) 4 mg capsule, Take 4 mg by mouth nightly., Disp: , Rfl:     sildenafil citrate (VIAGRA) 100 mg tablet, Take 100 mg by mouth as needed. , Disp: , Rfl:    cholecalciferol, vitamin D3, (VITAMIN D3) 2,000 unit tab, Take  by mouth. Holding for surgery, Disp: , Rfl:    Date Last Reviewed:  03-22-17   Number of Personal Factors/Comorbidities that affect the Plan of Care: 1-2: MODERATE COMPLEXITY   EXAMINATION:   Observation/Orthostatic Postural Assessment: Forward head, rounded shoulders; L UE in sling  Palpation:          Generalized tenderness L shoulder  ROM: (PROM)    L shoulder flexion = 70 degrees  L shoulder abduction = 45 degrees  L shoulder external rotation = 30 degrees    Strength:      Not assessed    Special Tests:      Not assessed    Neurological Screen:        Sensation: Intact to light touch L UE  Functional Mobility:         Pt able to transition sit to supine and supine to sit independently    Body Structures Involved:  1. Bones  2. Joints  3. Muscles Body Functions Affected:  1. Sensory/Pain  2. Neuromusculoskeletal Activities and Participation Affected:  1. Mobility  2. Self Care  3. Domestic Life   Number of elements (examined above) that affect the Plan of Care: 3: MODERATE COMPLEXITY   CLINICAL PRESENTATION:   Presentation: Evolving clinical presentation with changing clinical characteristics: MODERATE COMPLEXITY   CLINICAL DECISION MAKING:   Outcome Measure: Tool Used: Disabilities of the Arm, Shoulder and Hand (DASH) Questionnaire - Quick Version  Score:  Initial: 45/55  Most Recent: X/55 (Date: -- )   Interpretation of Score: The DASH is designed to measure the activities of daily living in person's with upper extremity dysfunction or pain. Each section is scored on a 1-5 scale, 5 representing the greatest disability. The scores of each section are added together for a total score of 55. Medical Necessity:   · Patient is expected to demonstrate progress in strength, range of motion and functional technique to increase independence with self care, household and lawn activities.   · Patient demonstrates good rehab potential due to higher previous functional level. Reason for Services/Other Comments:  · Patient continues to require skilled intervention due to decreased ROM/strength L shoulder with increased pain leading to decreased functional status. Use of outcome tool(s) and clinical judgement create a POC that gives a: Questionable prediction of patient's progress: MODERATE COMPLEXITY            TREATMENT:   (In addition to Assessment/Re-Assessment sessions the following treatments were rendered)  Pre-treatment Symptoms/Complaints: \"I am doing good, some achiness at night\"    Pain: Initial:     1/10 Minimal achiness Post Session:  1/10     THERAPEUTIC EXERCISE: (35 minutes):  Exercises per grid below to improve mobility and strength. Required minimal manual cues to promote proper body alignment and promote proper body posture. Progressed resistance, range and repetitions as indicated. MANUAL THERAPY: (10 minutes): PROM to L shoulder into flexion, abduction and external rotation was utilized and necessary because of the patient's restricted joint motion and restricted motion of soft tissue. MODALITIES: (10 minutes):      Game Ready Vasopneumatic Compression with ice to L shoulder x10 minutes to decrease pain and inflammation. Skin clear afterwards. Date:  5-1-17   Activity/Exercise Parameters   S/L ER #1 x 15 reps   Supine IR ER #1 x 15 reps each   Isometric's  HEP   IR ER tband Red x 15 reps each   Pulley's Flexion and Scaption x 20 each    Prone flexion-extension-scaption X 12 each way   S/L abduction X 15 reps   C.L.O.C.K Yellow x 10 reps flexion-scaption   UBE X 8 min Level 2.5 fwd-bwds   Wall ladder-wall slide X 5 reps each    Bicep curl #5 x 15 reps   Scapular Squeezes-  Extension Tband 20 reps-Green   Pendulum Exercises (Circles CW/CCW) 20 reps each   Wand in supine into flexion X 15 reps         Treatment/Session Assessment:    · Response to Treatment:   Patient doing well overall.   MD wanting patient to use his Left shoulder more-no heavy weights. More achiness then painful. Sleeping is more difficult getting comfortable. Continue plan of care. 6.5 weeks out from surgery at this time. · Compliance with Program/Exercises:Compliant  · Recommendations/Intent for next treatment session: \"Next visit will focus on ROM L shoulder\".   Total Treatment Duration:  55 minutes  PT Patient Time In/Time Out  Time In: 0315  Time Out: 0410    Amando Escobar, PTA

## 2017-05-04 ENCOUNTER — HOSPITAL ENCOUNTER (OUTPATIENT)
Dept: PHYSICAL THERAPY | Age: 72
Discharge: HOME | End: 2017-05-04
Payer: COMMERCIAL

## 2017-05-04 PROCEDURE — 97016 VASOPNEUMATIC DEVICE THERAPY: CPT

## 2017-05-04 PROCEDURE — 97110 THERAPEUTIC EXERCISES: CPT

## 2017-05-04 PROCEDURE — 97140 MANUAL THERAPY 1/> REGIONS: CPT

## 2017-05-04 NOTE — PROGRESS NOTES
Kishore Estrada  : 1945 [facility] [place] [address], [facility] [address]  [phone]   [phone]           OUTPATIENT PHYSICAL THERAPY:Daily Note and Progress Report 2017    ICD-10: Treatment Diagnosis: Unspecified rotator cuff tear or rupture of left shoulder, not specified as traumatic (M75.102); Pain in left shoulder (M25.512)  Precautions/Allergies:   Review of patient's allergies indicates no known allergies. Fall Risk Score: 1 (? 5 = High Risk)  MD Orders: Evaluate and Treat MEDICAL/REFERRING DIAGNOSIS:  Unspecified rotator cuff tear or rupture of left shoulder, not specified as traumatic [M75.102]   DATE OF ONSET: Surgery 03-15-17  REFERRING PHYSICIAN: Ney Kitchen MD  RETURN PHYSICIAN APPOINTMENT: 17     INITIAL ASSESSMENT:  Mr. Delene Nissen presents with decreased ROM/strength L shoulder with increased pain leading to decreased functional status s/p L shoulder surgery 03-15-17. Pt would benefit from skilled physical therapy services to address the above deficits and help patient return to prior level of function. PROBLEM LIST (Impacting functional limitations):  1. Decreased Strength  2. Decreased ADL/Functional Activities  3. Increased Pain  4. Decreased Flexibility/Joint Mobility  5. Decreased [place] with Home Exercise Program INTERVENTIONS PLANNED:  1. Cold  2. Cryotherapy  3. Electrical Stimulation  4. Home Exercise Program (HEP)  5. Manual Therapy  6. Range of Motion (ROM)  7. Therapeutic Exercise/Strengthening   TREATMENT PLAN:  Effective Dates: 17 TO 17. Frequency/Duration: 2 times a week for 3 months  GOALS: (Goals have been discussed and agreed upon with patient.)  Short-Term Functional Goals: Time Frame: 4 weeks  1. Pt will increase PROM L shoulder flexion = 90 degrees, abduction = 90 degrees, external rotation = 70 degrees to assist with self care ADL's  2.  Pt will be independent with HEP  Discharge Goals: Time Frame: 12 weeks  1. Pt will increase ROM L shoulder flexion = 160 degrees, abduction = 160 degrees, external rotation = 80 degrees, internal rotation = 70 degrees to assist with self care and lawn activities  2. Pt will increase strength L shoulder 4/5 or greater to assist with self care and lawn activities  3. Pt will be independent with HEP  4. Pt will perform 20 minutes household and lawn activities independently with min to no c/o L shoulder pain  Rehabilitation Potential For Stated Goals: Good  Regarding Stefano Medrano S Shahnaz's therapy, I certify that the treatment plan above will be carried out by a therapist or under their direction. Thank you for this referral,  Alex Spivey PT     Referring Physician Signature: Analilia Rodriguez MD              Date                    The information in this section was collected on 03-22-17 (except where otherwise noted). HISTORY:   5/4/2017:  Pt states his shoulder continues to improve. History of Present Injury/Illness (Reason for Referral):  Pt reports insidious onset L shoulder pain of 6-8 months duration. He had an MRI which revealed a rotator cuff tear and surgery was scheduled. Pt had arthroscopic rotator cuff repair, arthroscopic distal clavicle excision (Benoit procedure), arthroscopic labral debridement and arthroscopic subacromial decompression, left shoulder on 03-15-17. He rates his current L shoulder pain 1/10 sitting at rest, increasing to 4/10 at worst over past couple of days. He is R-handed. Pt works as an . He is currently working part time/modified duty due to his L shoulder surgery. Pt is taking oxycodone 5 mg prn for pain. He took some pain meds prior to his PT session today, but had not taken any for about 3 days prior to today. He is currently having difficulties with all self care and household/lawn activities due to his L shoulder surgery.   Past Medical History/Comorbidities:   . Eren Ron  has a past medical history of Arrhythmia; Enlarged prostate; H/O seasonal allergies; Hypercholesteremia; and Rotator cuff tear, left. Mr. Eren Ron  has a past surgical history that includes cervical diskectomy (05/2000); urological (1979); vasectomy (1970's); and colonoscopy. Social History/Living Environment:     Pt lives with wife who assists with ADL's as needed  Prior Level of Function/Work/Activity:  Independent with all work and household/lawn activities due to his L shoulder surgery  Dominant Side:         RIGHT  Other Clinical Tests:          MRI  Previous Treatment Approaches:          Surgery 03-15-17  Personal Factors:          Age:  67 y.o. Profession:  Pt works as an   Current Medications:       Current Outpatient Prescriptions:     finasteride (PROSCAR) 5 mg tablet, Take 5 mg by mouth every morning., Disp: , Rfl:     montelukast (SINGULAIR) 10 mg tablet, Take 10 mg by mouth nightly., Disp: , Rfl:     aspirin delayed-release 81 mg tablet, Take 81 mg by mouth every morning., Disp: , Rfl:     atorvastatin (LIPITOR) 40 mg tablet, Take 40 mg by mouth every morning., Disp: , Rfl:     Azelastine (ASTEPRO) 0.15 % (205.5 mcg) nasal spray, 2 Sprays by Both Nostrils route DIALYSIS PRN., Disp: , Rfl:     cyanocobalamin 1,000 mcg tablet, Take 1,000 mcg by mouth daily. , Disp: , Rfl:     Omega-3-DHA-EPA-Fish Oil (FISH OIL) 1,000 mg (120 mg-180 mg) cap, Take  by mouth two (2) times a day. Holding for suregey, Disp: , Rfl:     dronedarone (MULTAQ) tab tablet, Take 400 mg by mouth two (2) times daily (with meals). Indications: PAROXYSMAL ATRIAL FIBRILLATION, Disp: , Rfl:     multivitamin (ONE A DAY) tablet, Take 1 Tab by mouth daily. Holding till surgery, Disp: , Rfl:     silodosin (RAPAFLO) 4 mg capsule, Take 4 mg by mouth nightly., Disp: , Rfl:     sildenafil citrate (VIAGRA) 100 mg tablet, Take 100 mg by mouth as needed. , Disp: , Rfl:     cholecalciferol, vitamin D3, (VITAMIN D3) 2,000 unit tab, Take  by mouth. Holding for surgery, Disp: , Rfl:    Date Last Reviewed:  03-22-17   Number of Personal Factors/Comorbidities that affect the Plan of Care: 1-2: MODERATE COMPLEXITY   EXAMINATION:   Observation/Orthostatic Postural Assessment: Forward head, rounded shoulders; L UE in sling  Palpation:          Generalized tenderness L shoulder  ROM: (PROM)    L shoulder flexion = 160 degrees  L shoulder abduction = 160 degrees  L shoulder external rotation = 80 degrees    Strength:      Not assessed    Special Tests:      Not assessed    Neurological Screen:        Sensation: Intact to light touch L UE  Functional Mobility:         Pt able to transition sit to supine and supine to sit independently    Body Structures Involved:  1. Bones  2. Joints  3. Muscles Body Functions Affected:  1. Sensory/Pain  2. Neuromusculoskeletal Activities and Participation Affected:  1. Mobility  2. Self Care  3. Domestic Life   Number of elements (examined above) that affect the Plan of Care: 3: MODERATE COMPLEXITY   CLINICAL PRESENTATION:   Presentation: Evolving clinical presentation with changing clinical characteristics: MODERATE COMPLEXITY   CLINICAL DECISION MAKING:   Outcome Measure: Tool Used: Disabilities of the Arm, Shoulder and Hand (DASH) Questionnaire - Quick Version  Score:  Initial: 45/55  Most Recent: X/55 (Date: -- )   Interpretation of Score: The DASH is designed to measure the activities of daily living in person's with upper extremity dysfunction or pain. Each section is scored on a 1-5 scale, 5 representing the greatest disability. The scores of each section are added together for a total score of 55. Medical Necessity:   · Patient is expected to demonstrate progress in strength, range of motion and functional technique to increase independence with self care, household and lawn activities.   · Patient demonstrates good rehab potential due to higher previous functional level. Reason for Services/Other Comments:  · Patient continues to require skilled intervention due to decreased ROM/strength L shoulder with increased pain leading to decreased functional status. Use of outcome tool(s) and clinical judgement create a POC that gives a: Questionable prediction of patient's progress: MODERATE COMPLEXITY            TREATMENT:   (In addition to Assessment/Re-Assessment sessions the following treatments were rendered)  Pre-treatment Symptoms/Complaints: Pt states his shoulder is feeling good and he is having no pain. Pain: Initial:     0/10 Post Session:  0/10     THERAPEUTIC EXERCISE: (35 minutes):  Exercises per grid below to improve mobility and strength. Required minimal manual cues to promote proper body alignment and promote proper body posture. Progressed resistance, range and repetitions as indicated. MANUAL THERAPY: (10 minutes): PROM to L shoulder into flexion, abduction and external rotation was utilized and necessary because of the patient's restricted joint motion and restricted motion of soft tissue. MODALITIES: (10 minutes):      Game Ready Vasopneumatic Compression with ice to L shoulder x10 minutes to decrease pain and inflammation. Skin clear afterwards.     Date:  5-1-17 Date  05-04-17   Activity/Exercise Parameters    S/L ER #1 x 15 reps 1 Lb  20 reps   Supine IR ER #1 x 15 reps each    Isometric's  HEP    IR ER tband Red x 15 reps each Red  20 reps each   Pulley's Flexion and Scaption x 20 each  20 reps   Prone flexion-extension-scaption X 12 each way Ext, Hor Abd, Scaption  15 reps each   S/L abduction X 15 reps    C.L.O.C.K Yellow x 10 reps flexion-scaption    UBE X 8 min Level 2.5 fwd-bwds Manual L3  8 minutes   Wall ladder-wall slide X 5 reps each  With Towel On Wall   20 reps   Bicep curl #5 x 15 reps 5 Lbs  20 reps   Scapular Squeezes-  Extension Tband 20 reps-Green Green  20 reps each   Pendulum Exercises (Circles CW/CCW) 20 reps each Wand in supine into flexion X 15 reps    Standing Shoulder Flexion and Scaption to 90 degrees  1 Lb  15 reps each         Treatment/Session Assessment:    · Response to Treatment:   Pt tolerated all treatments well with no c/o. ROM and strength progressing well. · Compliance with Program/Exercises:Compliant  · Recommendations/Intent for next treatment session: \"Next visit will focus on ROM L shoulder\".   Total Treatment Duration:  55 minutes  PT Patient Time In/Time Out  Time In: [facility]  Time Out: Ara Mckenzie [address] Raman, ARLEEN

## 2017-05-08 ENCOUNTER — HOSPITAL ENCOUNTER (OUTPATIENT)
Dept: PHYSICAL THERAPY | Age: 72
Discharge: HOME | End: 2017-05-08
Payer: COMMERCIAL

## 2017-05-08 PROCEDURE — 97110 THERAPEUTIC EXERCISES: CPT

## 2017-05-08 PROCEDURE — 97140 MANUAL THERAPY 1/> REGIONS: CPT

## 2017-05-08 NOTE — PROGRESS NOTES
Holly Yates  : 1945 [facility] [place] [address], [address] [facility]  [phone]   [phone]           OUTPATIENT PHYSICAL THERAPY:Daily Note 2017    ICD-10: Treatment Diagnosis: Unspecified rotator cuff tear or rupture of left shoulder, not specified as traumatic (M75.102); Pain in left shoulder (M25.512)  Precautions/Allergies:   Review of patient's allergies indicates no known allergies. Fall Risk Score: 1 (? 5 = High Risk)  MD Orders: Evaluate and Treat MEDICAL/REFERRING DIAGNOSIS:  Unspecified rotator cuff tear or rupture of left shoulder, not specified as traumatic [M75.102]   DATE OF ONSET: Surgery 03-15-17  REFERRING PHYSICIAN: Real Barth MD  RETURN PHYSICIAN APPOINTMENT: 17     INITIAL ASSESSMENT:  Mr. Casey Bailon presents with decreased ROM/strength L shoulder with increased pain leading to decreased functional status s/p L shoulder surgery 03-15-17. Pt would benefit from skilled physical therapy services to address the above deficits and help patient return to prior level of function. PROBLEM LIST (Impacting functional limitations):  1. Decreased Strength  2. Decreased ADL/Functional Activities  3. Increased Pain  4. Decreased Flexibility/Joint Mobility  5. Decreased [place] with Home Exercise Program INTERVENTIONS PLANNED:  1. Cold  2. Cryotherapy  3. Electrical Stimulation  4. Home Exercise Program (HEP)  5. Manual Therapy  6. Range of Motion (ROM)  7. Therapeutic Exercise/Strengthening   TREATMENT PLAN:  Effective Dates: 17 TO 17. Frequency/Duration: 2 times a week for 3 months  GOALS: (Goals have been discussed and agreed upon with patient.)  Short-Term Functional Goals: Time Frame: 4 weeks  1. Pt will increase PROM L shoulder flexion = 90 degrees, abduction = 90 degrees, external rotation = 70 degrees to assist with self care ADL's  2.  Pt will be independent with HEP  Discharge Goals: Time Frame: 12 weeks  1. Pt will increase ROM L shoulder flexion = 160 degrees, abduction = 160 degrees, external rotation = 80 degrees, internal rotation = 70 degrees to assist with self care and lawn activities  2. Pt will increase strength L shoulder 4/5 or greater to assist with self care and lawn activities  3. Pt will be independent with HEP  4. Pt will perform 20 minutes household and lawn activities independently with min to no c/o L shoulder pain  Rehabilitation Potential For Stated Goals: Good  Regarding Fidelia Christie's therapy, I certify that the treatment plan above will be carried out by a therapist or under their direction. Thank you for this referral,  Asher Mccormick PTA     Referring Physician Signature: Zarina Chaidez MD              Date                    The information in this section was collected on 03-22-17 (except where otherwise noted). HISTORY:   5/8/2017:  Pt states his shoulder continues to improve. History of Present Injury/Illness (Reason for Referral):  Pt reports insidious onset L shoulder pain of 6-8 months duration. He had an MRI which revealed a rotator cuff tear and surgery was scheduled. Pt had arthroscopic rotator cuff repair, arthroscopic distal clavicle excision (Benoit procedure), arthroscopic labral debridement and arthroscopic subacromial decompression, left shoulder on 03-15-17. He rates his current L shoulder pain 1/10 sitting at rest, increasing to 4/10 at worst over past couple of days. He is R-handed. Pt works as an . He is currently working part time/modified duty due to his L shoulder surgery. Pt is taking oxycodone 5 mg prn for pain. He took some pain meds prior to his PT session today, but had not taken any for about 3 days prior to today. He is currently having difficulties with all self care and household/lawn activities due to his L shoulder surgery.   Past Medical History/Comorbidities:   Mr. Shameka Anderson  has a past medical history of Arrhythmia; Enlarged prostate; H/O seasonal allergies; Hypercholesteremia; and Rotator cuff tear, left. Mr. Narciso Menendez  has a past surgical history that includes cervical diskectomy (05/2000); urological (1979); vasectomy (1970's); and colonoscopy. Social History/Living Environment:     Pt lives with wife who assists with ADL's as needed  Prior Level of Function/Work/Activity:  Independent with all work and household/lawn activities due to his L shoulder surgery  Dominant Side:         RIGHT  Other Clinical Tests:          MRI  Previous Treatment Approaches:          Surgery 03-15-17  Personal Factors:          Age:  67 y.o. Profession:  Pt works as an   Current Medications:       Current Outpatient Prescriptions:     finasteride (PROSCAR) 5 mg tablet, Take 5 mg by mouth every morning., Disp: , Rfl:     montelukast (SINGULAIR) 10 mg tablet, Take 10 mg by mouth nightly., Disp: , Rfl:     aspirin delayed-release 81 mg tablet, Take 81 mg by mouth every morning., Disp: , Rfl:     atorvastatin (LIPITOR) 40 mg tablet, Take 40 mg by mouth every morning., Disp: , Rfl:     Azelastine (ASTEPRO) 0.15 % (205.5 mcg) nasal spray, 2 Sprays by Both Nostrils route DIALYSIS PRN., Disp: , Rfl:     cyanocobalamin 1,000 mcg tablet, Take 1,000 mcg by mouth daily. , Disp: , Rfl:     Omega-3-DHA-EPA-Fish Oil (FISH OIL) 1,000 mg (120 mg-180 mg) cap, Take  by mouth two (2) times a day. Holding for suregey, Disp: , Rfl:     dronedarone (MULTAQ) tab tablet, Take 400 mg by mouth two (2) times daily (with meals). Indications: PAROXYSMAL ATRIAL FIBRILLATION, Disp: , Rfl:     multivitamin (ONE A DAY) tablet, Take 1 Tab by mouth daily. Holding till surgery, Disp: , Rfl:     silodosin (RAPAFLO) 4 mg capsule, Take 4 mg by mouth nightly., Disp: , Rfl:     sildenafil citrate (VIAGRA) 100 mg tablet, Take 100 mg by mouth as needed. , Disp: , Rfl:     cholecalciferol, vitamin D3, (VITAMIN D3) 2,000 unit tab, Take  by mouth. Holding for surgery, Disp: , Rfl:    Date Last Reviewed:  03-22-17   Number of Personal Factors/Comorbidities that affect the Plan of Care: 1-2: MODERATE COMPLEXITY   EXAMINATION:   Observation/Orthostatic Postural Assessment: Forward head, rounded shoulders; L UE in sling  Palpation:          Generalized tenderness L shoulder  ROM: (PROM)    L shoulder flexion = 160 degrees  L shoulder abduction = 160 degrees  L shoulder external rotation = 80 degrees    Strength:      Not assessed    Special Tests:      Not assessed    Neurological Screen:        Sensation: Intact to light touch L UE  Functional Mobility:         Pt able to transition sit to supine and supine to sit independently    Body Structures Involved:  1. Bones  2. Joints  3. Muscles Body Functions Affected:  1. Sensory/Pain  2. Neuromusculoskeletal Activities and Participation Affected:  1. Mobility  2. Self Care  3. Domestic Life   Number of elements (examined above) that affect the Plan of Care: 3: MODERATE COMPLEXITY   CLINICAL PRESENTATION:   Presentation: Evolving clinical presentation with changing clinical characteristics: MODERATE COMPLEXITY   CLINICAL DECISION MAKING:   Outcome Measure: Tool Used: Disabilities of the Arm, Shoulder and Hand (DASH) Questionnaire - Quick Version  Score:  Initial: 45/55  Most Recent: X/55 (Date: -- )   Interpretation of Score: The DASH is designed to measure the activities of daily living in person's with upper extremity dysfunction or pain. Each section is scored on a 1-5 scale, 5 representing the greatest disability. The scores of each section are added together for a total score of 55. Medical Necessity:   · Patient is expected to demonstrate progress in strength, range of motion and functional technique to increase independence with self care, household and lawn activities.   · Patient demonstrates good rehab potential due to higher previous functional level.  Reason for Services/Other Comments:  · Patient continues to require skilled intervention due to decreased ROM/strength L shoulder with increased pain leading to decreased functional status. Use of outcome tool(s) and clinical judgement create a POC that gives a: Questionable prediction of patient's progress: MODERATE COMPLEXITY            TREATMENT:   (In addition to Assessment/Re-Assessment sessions the following treatments were rendered)  Pre-treatment Symptoms/Complaints: Pt states his shoulder is feeling good and he is having no pain. Pain: Initial:     0/10 Post Session:  0/10     THERAPEUTIC EXERCISE: (30 minutes):  Exercises per grid below to improve mobility and strength. Required minimal manual cues to promote proper body alignment and promote proper body posture. Progressed resistance, range and repetitions as indicated. MANUAL THERAPY: (10 minutes): PROM to L shoulder into flexion, abduction and external rotation was utilized and necessary because of the patient's restricted joint motion and restricted motion of soft tissue. MODALITIES: (0 minutes):      Game Ready Vasopneumatic Compression with ice to L shoulder x10 minutes to decrease pain and inflammation. Skin clear afterwards.     Date:  5-1-17 Date  05-8-17   Activity/Exercise Parameters    S/L ER #1 x 15 reps 1 Lb  20 reps   Supine IR ER #1 x 15 reps each    Wall push ups  X 12 reps   IR ER tband Red x 15 reps each blue  20 reps each   Pulley's Flexion and Scaption x 20 each  20 reps   Prone flexion-extension-scaption X 12 each way Ext, Hor Abd, Scaption  15 reps each   S/L abduction X 15 reps    C.L.O.C.K Yellow x 10 reps flexion-scaption    UBE X 8 min Level 2.5 fwd-bwds Manual L3  8 minutes   Wall ladder-wall slide X 5 reps each  With Towel On Wall   20 reps   Bicep curl #5 x 15 reps 5 Lbs  20 reps   Scapular Squeezes-  Extension Tband 20 reps-Green blue  20 reps each   Pendulum Exercises (Circles CW/CCW) 20 reps each    Red ball roll up into flexion off wall  X 15 reps   Standing Shoulder Flexion and Scaption to 90 degrees  1 Lb  15 reps each         Treatment/Session Assessment:    · Response to Treatment:   Pt tolerated all treatments well with no c/o. ROM and strength progressing well. · Compliance with Program/Exercises:Compliant  · Recommendations/Intent for next treatment session: \"Next visit will focus on ROM L shoulder\".   Total Treatment Duration: 40 minutes  PT Patient Time In/Time Out  Time In: 0315  Time Out: [facility] Kaitlin New PTA

## 2017-05-10 ENCOUNTER — HOSPITAL ENCOUNTER (OUTPATIENT)
Dept: PHYSICAL THERAPY | Age: 72
Discharge: HOME | End: 2017-05-10
Payer: COMMERCIAL

## 2017-05-10 PROCEDURE — 97110 THERAPEUTIC EXERCISES: CPT

## 2017-05-10 PROCEDURE — 97140 MANUAL THERAPY 1/> REGIONS: CPT

## 2017-05-10 NOTE — PROGRESS NOTES
Clara Padmaja  : 1945 [facility] at [facility]  [address], [address] [address],[address], [address] [facility] ([facility]  [phone]   [phone]           OUTPATIENT PHYSICAL THERAPY:Daily Note 5/10/2017    ICD-10: Treatment Diagnosis: Unspecified rotator cuff tear or rupture of left shoulder, not specified as traumatic (M75.102); Pain in left shoulder (M25.512)  Precautions/Allergies:   Review of patient's allergies indicates no known allergies. Fall Risk Score: 1 (? 5 = High Risk)  MD Orders: Evaluate and Treat MEDICAL/REFERRING DIAGNOSIS:  Unspecified rotator cuff tear or rupture of left shoulder, not specified as traumatic [M75.102]   DATE OF ONSET: Surgery 03-15-17  REFERRING PHYSICIAN: Mora Zabala MD  RETURN PHYSICIAN APPOINTMENT: 17     INITIAL ASSESSMENT:  Mr. Connie Gustafson presents with decreased ROM/strength L shoulder with increased pain leading to decreased functional status s/p L shoulder surgery 03-15-17. Pt would benefit from skilled physical therapy services to address the above deficits and help patient return to prior level of function. PROBLEM LIST (Impacting functional limitations):  1. Decreased Strength  2. Decreased ADL/Functional Activities  3. Increased Pain  4. Decreased Flexibility/Joint Mobility  5. Decreased [place] with Home Exercise Program INTERVENTIONS PLANNED:  1. Cold  2. Cryotherapy  3. Electrical Stimulation  4. Home Exercise Program (HEP)  5. Manual Therapy  6. Range of Motion (ROM)  7. Therapeutic Exercise/Strengthening   TREATMENT PLAN:  Effective Dates: 17 TO 17. Frequency/Duration: 2 times a week for 3 months  GOALS: (Goals have been discussed and agreed upon with patient.)  Short-Term Functional Goals: Time Frame: 4 weeks  1. Pt will increase PROM L shoulder flexion = 90 degrees, abduction = 90 degrees, external rotation = 70 degrees to assist with self care ADL's  2.  Pt will be independent with HEP  Discharge Goals: Time Frame: 12 weeks  1. Pt will increase ROM L shoulder flexion = 160 degrees, abduction = 160 degrees, external rotation = 80 degrees, internal rotation = 70 degrees to assist with self care and lawn activities  2. Pt will increase strength L shoulder 4/5 or greater to assist with self care and lawn activities  3. Pt will be independent with HEP  4. Pt will perform 20 minutes household and lawn activities independently with min to no c/o L shoulder pain  Rehabilitation Potential For Stated Goals: Good  Regarding Yulia Wong S Shahnaz's therapy, I certify that the treatment plan above will be carried out by a therapist or under their direction. Thank you for this referral,  Analilia Chapman PT     Referring Physician Signature: Anmol Fox MD              Date                    The information in this section was collected on 03-22-17 (except where otherwise noted). HISTORY:       History of Present Injury/Illness (Reason for Referral):  Pt reports insidious onset L shoulder pain of 6-8 months duration. He had an MRI which revealed a rotator cuff tear and surgery was scheduled. Pt had arthroscopic rotator cuff repair, arthroscopic distal clavicle excision (Benoit procedure), arthroscopic labral debridement and arthroscopic subacromial decompression, left shoulder on 03-15-17. He rates his current L shoulder pain 1/10 sitting at rest, increasing to 4/10 at worst over past couple of days. He is R-handed. Pt works as an . He is currently working part time/modified duty due to his L shoulder surgery. Pt is taking oxycodone 5 mg prn for pain. He took some pain meds prior to his PT session today, but had not taken any for about 3 days prior to today. He is currently having difficulties with all self care and household/lawn activities due to his L shoulder surgery.   Past Medical History/Comorbidities:   Mr. Narciso Menendez  has a past medical history of Arrhythmia; Enlarged prostate; H/O seasonal allergies; Hypercholesteremia; and Rotator cuff tear, left. Mr. Salma Perez  has a past surgical history that includes cervical diskectomy (05/2000); urological (1979); vasectomy (1970's); and colonoscopy. Social History/Living Environment:     Pt lives with wife who assists with ADL's as needed  Prior Level of Function/Work/Activity:  Independent with all work and household/lawn activities due to his L shoulder surgery  Dominant Side:         RIGHT  Other Clinical Tests:          MRI  Previous Treatment Approaches:          Surgery 03-15-17  Personal Factors:          Age:  67 y.o. Profession:  Pt works as an   Current Medications:       Current Outpatient Prescriptions:     finasteride (PROSCAR) 5 mg tablet, Take 5 mg by mouth every morning., Disp: , Rfl:     montelukast (SINGULAIR) 10 mg tablet, Take 10 mg by mouth nightly., Disp: , Rfl:     aspirin delayed-release 81 mg tablet, Take 81 mg by mouth every morning., Disp: , Rfl:     atorvastatin (LIPITOR) 40 mg tablet, Take 40 mg by mouth every morning., Disp: , Rfl:     Azelastine (ASTEPRO) 0.15 % (205.5 mcg) nasal spray, 2 Sprays by Both Nostrils route DIALYSIS PRN., Disp: , Rfl:     cyanocobalamin 1,000 mcg tablet, Take 1,000 mcg by mouth daily. , Disp: , Rfl:     Omega-3-DHA-EPA-Fish Oil (FISH OIL) 1,000 mg (120 mg-180 mg) cap, Take  by mouth two (2) times a day. Holding for suregey, Disp: , Rfl:     dronedarone (MULTAQ) tab tablet, Take 400 mg by mouth two (2) times daily (with meals). Indications: PAROXYSMAL ATRIAL FIBRILLATION, Disp: , Rfl:     multivitamin (ONE A DAY) tablet, Take 1 Tab by mouth daily. Holding till surgery, Disp: , Rfl:     silodosin (RAPAFLO) 4 mg capsule, Take 4 mg by mouth nightly., Disp: , Rfl:     sildenafil citrate (VIAGRA) 100 mg tablet, Take 100 mg by mouth as needed. , Disp: , Rfl:     cholecalciferol, vitamin D3, (VITAMIN D3) 2,000 unit tab, Take  by mouth.  Holding for surgery, Disp: , Rfl:    Date Last Reviewed:  03-22-17   Number of Personal Factors/Comorbidities that affect the Plan of Care: 1-2: MODERATE COMPLEXITY   EXAMINATION:   Observation/Orthostatic Postural Assessment: Forward head, rounded shoulders; L UE in sling  Palpation:          Generalized tenderness L shoulder  ROM: (PROM)    L shoulder flexion = 160 degrees  L shoulder abduction = 160 degrees  L shoulder external rotation = 80 degrees    Strength:      Not assessed    Special Tests:      Not assessed    Neurological Screen:        Sensation: Intact to light touch L UE  Functional Mobility:         Pt able to transition sit to supine and supine to sit independently    Body Structures Involved:  1. Bones  2. Joints  3. Muscles Body Functions Affected:  1. Sensory/Pain  2. Neuromusculoskeletal Activities and Participation Affected:  1. Mobility  2. Self Care  3. Domestic Life   Number of elements (examined above) that affect the Plan of Care: 3: MODERATE COMPLEXITY   CLINICAL PRESENTATION:   Presentation: Evolving clinical presentation with changing clinical characteristics: MODERATE COMPLEXITY   CLINICAL DECISION MAKING:   Outcome Measure: Tool Used: Disabilities of the Arm, Shoulder and Hand (DASH) Questionnaire - Quick Version  Score:  Initial: 45/55  Most Recent: X/55 (Date: -- )   Interpretation of Score: The DASH is designed to measure the activities of daily living in person's with upper extremity dysfunction or pain. Each section is scored on a 1-5 scale, 5 representing the greatest disability. The scores of each section are added together for a total score of 55. Medical Necessity:   · Patient is expected to demonstrate progress in strength, range of motion and functional technique to increase independence with self care, household and lawn activities. · Patient demonstrates good rehab potential due to higher previous functional level.   Reason for Services/Other Comments:  · Patient continues to require skilled intervention due to decreased ROM/strength L shoulder with increased pain leading to decreased functional status. Use of outcome tool(s) and clinical judgement create a POC that gives a: Questionable prediction of patient's progress: MODERATE COMPLEXITY            TREATMENT:   (In addition to Assessment/Re-Assessment sessions the following treatments were rendered)  Pre-treatment Symptoms/Complaints: Pt states his shoulder is mainly sore at night. Pain: Initial:     0/10 Post Session:  0/10     THERAPEUTIC EXERCISE: (30 minutes):  Exercises per grid below to improve mobility and strength. Required minimal manual cues to promote proper body alignment and promote proper body posture. Progressed resistance, range and repetitions as indicated. MANUAL THERAPY: (10 minutes): PROM to L shoulder into flexion, abduction and external rotation was utilized and necessary because of the patient's restricted joint motion and restricted motion of soft tissue. MODALITIES: (0 minutes):      Game Ready Vasopneumatic Compression with ice to L shoulder x10 minutes to decrease pain and inflammation. Skin clear afterwards.     Date:  5-1-17 Date  05-8-17 Date  05-10-17   Activity/Exercise Parameters     S/L ER #1 x 15 reps 1 Lb  20 reps 2 Lbs  20 reps   Supine IR ER #1 x 15 reps each     Wall push ups  X 12 reps 15 reps   IR ER tband Red x 15 reps each blue  20 reps each Blue  20 reps each   Pulley's Flexion and Scaption x 20 each  20 reps    Prone flexion-extension-scaption X 12 each way Ext, Hor Abd, Scaption  15 reps each Ext and Rows  1 Lb  20 reps each   S/L abduction X 15 reps     C.L.O.C.K Yellow x 10 reps flexion-scaption     UBE X 8 min Level 2.5 fwd-bwds Manual L3  8 minutes Manual L3  8 minutes   Wall ladder-wall slide X 5 reps each  With Towel On Wall   20 reps With Towel on Wall  20 reps   Bicep curl #5 x 15 reps 5 Lbs  20 reps 5 Lbs  20 reps   Scapular Squeezes-  Extension Tband 20 reps-Green blue  20 reps each Rows and Straight Arm Pulldowns  Blue  20 reps   Pendulum Exercises (Circles CW/CCW) 20 reps each     Red ball roll up into flexion off wall  X 15 reps    Standing Shoulder Flexion and Scaption to 90 degrees  1 Lb  15 reps each 1 Lb  15 reps each         Treatment/Session Assessment:    · Response to Treatment:   Pt tolerated session well. Pt educated on using a pillow between left arm and side of body during sleep to support UE to see if helps with sleeping. · Compliance with Program/Exercises:Compliant  · Recommendations/Intent for next treatment session: \"Next visit will focus on ROM L shoulder\".   Total Treatment Duration: 40 minutes  PT Patient Time In/Time Out  Time In: 4531  Time Out: [address] Rueben Boeck, PT

## 2017-05-15 ENCOUNTER — HOSPITAL ENCOUNTER (OUTPATIENT)
Dept: PHYSICAL THERAPY | Age: 72
Discharge: HOME | End: 2017-05-15
Payer: COMMERCIAL

## 2017-05-15 PROCEDURE — 97110 THERAPEUTIC EXERCISES: CPT

## 2017-05-15 PROCEDURE — 97140 MANUAL THERAPY 1/> REGIONS: CPT

## 2017-05-15 NOTE — PROGRESS NOTES
Luis Armando Mejia  : 1945 [facility] at [address], [address] [place] [address],[address] [facility], [place] [address].  [phone]   [phone]           OUTPATIENT PHYSICAL THERAPY:Daily Note 5/15/2017    ICD-10: Treatment Diagnosis: Unspecified rotator cuff tear or rupture of left shoulder, not specified as traumatic (M75.102); Pain in left shoulder (M25.512)  Precautions/Allergies:   Review of patient's allergies indicates no known allergies. Fall Risk Score: 1 (? 5 = High Risk)  MD Orders: Evaluate and Treat MEDICAL/REFERRING DIAGNOSIS:  Unspecified rotator cuff tear or rupture of left shoulder, not specified as traumatic [M75.102]   DATE OF ONSET: Surgery 03-15-17  REFERRING PHYSICIAN: Colletta Pelton, MD  RETURN PHYSICIAN APPOINTMENT: 17     INITIAL ASSESSMENT:  Mr. Tori Cabello presents with decreased ROM/strength L shoulder with increased pain leading to decreased functional status s/p L shoulder surgery 03-15-17. Pt would benefit from skilled physical therapy services to address the above deficits and help patient return to prior level of function. PROBLEM LIST (Impacting functional limitations):  1. Decreased Strength  2. Decreased ADL/Functional Activities  3. Increased Pain  4. Decreased Flexibility/Joint Mobility  5. Decreased [place] with Home Exercise Program INTERVENTIONS PLANNED:  1. Cold  2. Cryotherapy  3. Electrical Stimulation  4. Home Exercise Program (HEP)  5. Manual Therapy  6. Range of Motion (ROM)  7. Therapeutic Exercise/Strengthening   TREATMENT PLAN:  Effective Dates: 17 TO 17. Frequency/Duration: 2 times a week for 3 months  GOALS: (Goals have been discussed and agreed upon with patient.)  Short-Term Functional Goals: Time Frame: 4 weeks  1. Pt will increase PROM L shoulder flexion = 90 degrees, abduction = 90 degrees, external rotation = 70 degrees to assist with self care ADL's  2.  Pt will be independent with HEP  Discharge Goals: Time Frame: 12 weeks  1. Pt will increase ROM L shoulder flexion = 160 degrees, abduction = 160 degrees, external rotation = 80 degrees, internal rotation = 70 degrees to assist with self care and lawn activities  2. Pt will increase strength L shoulder 4/5 or greater to assist with self care and lawn activities  3. Pt will be independent with HEP  4. Pt will perform 20 minutes household and lawn activities independently with min to no c/o L shoulder pain  Rehabilitation Potential For Stated Goals: Good  Regarding Sharri Christie's therapy, I certify that the treatment plan above will be carried out by a therapist or under their direction. Thank you for this referral,  Lisa Nolan PT     Referring Physician Signature: Soumya Meadows MD              Date                    The information in this section was collected on 03-22-17 (except where otherwise noted). HISTORY:       History of Present Injury/Illness (Reason for Referral):  Pt reports insidious onset L shoulder pain of 6-8 months duration. He had an MRI which revealed a rotator cuff tear and surgery was scheduled. Pt had arthroscopic rotator cuff repair, arthroscopic distal clavicle excision (Benoit procedure), arthroscopic labral debridement and arthroscopic subacromial decompression, left shoulder on 03-15-17. He rates his current L shoulder pain 1/10 sitting at rest, increasing to 4/10 at worst over past couple of days. He is R-handed. Pt works as an . He is currently working part time/modified duty due to his L shoulder surgery. Pt is taking oxycodone 5 mg prn for pain. He took some pain meds prior to his PT session today, but had not taken any for about 3 days prior to today. He is currently having difficulties with all self care and household/lawn activities due to his L shoulder surgery.   Past Medical History/Comorbidities:   Mr. Tea Marie  has a past medical history of Arrhythmia; Enlarged prostate; H/O seasonal allergies; Hypercholesteremia; and Rotator cuff tear, left. Mr. Gab Donovan  has a past surgical history that includes cervical diskectomy (05/2000); urological (1979); vasectomy (1970's); and colonoscopy. Social History/Living Environment:     Pt lives with wife who assists with ADL's as needed  Prior Level of Function/Work/Activity:  Independent with all work and household/lawn activities due to his L shoulder surgery  Dominant Side:         RIGHT  Other Clinical Tests:          MRI  Previous Treatment Approaches:          Surgery 03-15-17  Personal Factors:          Age:  67 y.o. Profession:  Pt works as an   Current Medications:       Current Outpatient Prescriptions:     finasteride (PROSCAR) 5 mg tablet, Take 5 mg by mouth every morning., Disp: , Rfl:     montelukast (SINGULAIR) 10 mg tablet, Take 10 mg by mouth nightly., Disp: , Rfl:     aspirin delayed-release 81 mg tablet, Take 81 mg by mouth every morning., Disp: , Rfl:     atorvastatin (LIPITOR) 40 mg tablet, Take 40 mg by mouth every morning., Disp: , Rfl:     Azelastine (ASTEPRO) 0.15 % (205.5 mcg) nasal spray, 2 Sprays by Both Nostrils route DIALYSIS PRN., Disp: , Rfl:     cyanocobalamin 1,000 mcg tablet, Take 1,000 mcg by mouth daily. , Disp: , Rfl:     Omega-3-DHA-EPA-Fish Oil (FISH OIL) 1,000 mg (120 mg-180 mg) cap, Take  by mouth two (2) times a day. Holding for suregey, Disp: , Rfl:     dronedarone (MULTAQ) tab tablet, Take 400 mg by mouth two (2) times daily (with meals). Indications: PAROXYSMAL ATRIAL FIBRILLATION, Disp: , Rfl:     multivitamin (ONE A DAY) tablet, Take 1 Tab by mouth daily. Holding till surgery, Disp: , Rfl:     silodosin (RAPAFLO) 4 mg capsule, Take 4 mg by mouth nightly., Disp: , Rfl:     sildenafil citrate (VIAGRA) 100 mg tablet, Take 100 mg by mouth as needed. , Disp: , Rfl:     cholecalciferol, vitamin D3, (VITAMIN D3) 2,000 unit tab, Take  by mouth.  Holding for surgery, Disp: , Rfl:    Date Last Reviewed:  03-22-17   Number of Personal Factors/Comorbidities that affect the Plan of Care: 1-2: MODERATE COMPLEXITY   EXAMINATION:   Observation/Orthostatic Postural Assessment: Forward head, rounded shoulders; L UE in sling  Palpation:          Generalized tenderness L shoulder  ROM: (PROM)    L shoulder flexion = 160 degrees  L shoulder abduction = 160 degrees  L shoulder external rotation = 80 degrees    Strength:      Not assessed    Special Tests:      Not assessed    Neurological Screen:        Sensation: Intact to light touch L UE  Functional Mobility:         Pt able to transition sit to supine and supine to sit independently    Body Structures Involved:  1. Bones  2. Joints  3. Muscles Body Functions Affected:  1. Sensory/Pain  2. Neuromusculoskeletal Activities and Participation Affected:  1. Mobility  2. Self Care  3. Domestic Life   Number of elements (examined above) that affect the Plan of Care: 3: MODERATE COMPLEXITY   CLINICAL PRESENTATION:   Presentation: Evolving clinical presentation with changing clinical characteristics: MODERATE COMPLEXITY   CLINICAL DECISION MAKING:   Outcome Measure: Tool Used: Disabilities of the Arm, Shoulder and Hand (DASH) Questionnaire - Quick Version  Score:  Initial: 45/55  Most Recent: X/55 (Date: -- )   Interpretation of Score: The DASH is designed to measure the activities of daily living in person's with upper extremity dysfunction or pain. Each section is scored on a 1-5 scale, 5 representing the greatest disability. The scores of each section are added together for a total score of 55. Medical Necessity:   · Patient is expected to demonstrate progress in strength, range of motion and functional technique to increase independence with self care, household and lawn activities. · Patient demonstrates good rehab potential due to higher previous functional level.   Reason for Services/Other Comments:  · Patient continues to require skilled intervention due to decreased ROM/strength L shoulder with increased pain leading to decreased functional status. Use of outcome tool(s) and clinical judgement create a POC that gives a: Questionable prediction of patient's progress: MODERATE COMPLEXITY            TREATMENT:   (In addition to Assessment/Re-Assessment sessions the following treatments were rendered)  Pre-treatment Symptoms/Complaints: Pt states his shoulder is still sore at night mainly. Pain: Initial:     1/10 Post Session:  1/10     THERAPEUTIC EXERCISE: (30 minutes):  Exercises per grid below to improve mobility and strength. Required minimal manual cues to promote proper body alignment and promote proper body posture. Progressed resistance, range and repetitions as indicated. MANUAL THERAPY: (10 minutes): PROM to L shoulder into flexion, abduction and external rotation was utilized and necessary because of the patient's restricted joint motion and restricted motion of soft tissue. MODALITIES: (10 minutes):      Cold pack to L shoulder x10 minutes to decrease pain. Skin clear afterwards.     Date  05-10-17 Date  05-15-17   Activity/Exercise     S/L ER 2 Lbs  20 reps 2 Lbs  20 reps   Supine IR ER     Wall push ups 15 reps 20 reps   IR ER tband Blue  20 reps each Blue  20 reps each   Pulley's     Prone flexion-extension-scaption Ext and Rows  1 Lb  20 reps each Ext and Rows  1 Lb  20 reps each   S/L abduction     C.L.O.C.K     UBE Manual L3  8 minutes Manual L3  8 minutes   Wall ladder-wall slide With Towel on Wall  20 reps With Towel on Wall  20 reps   Bicep curl 5 Lbs  20 reps 5 Lbs  20 reps   Scapular Squeezes-  Extension Tband Rows and Straight Arm Pulldowns  Blue  20 reps Rows and Straight Arm Pulldowns  Blue   20 reps   Pendulum Exercises (Circles CW/CCW)     Red ball roll up into flexion off wall     Standing Shoulder Flexion and Scaption to 90 degrees 1 Lb  15 reps each 1 Lb  15 reps each         Treatment/Session Assessment:    · Response to Treatment:   Pt tolerated session well. ROM and strength improving. · Compliance with Program/Exercises:Compliant  · Recommendations/Intent for next treatment session: \"Next visit will focus on ROM L shoulder\".   Total Treatment Duration: 50 minutes  PT Patient Time In/Time Out  Time In: [address] [place] [address]  Time Out: Lucila Magana. Cristina Molina

## 2017-05-18 ENCOUNTER — HOSPITAL ENCOUNTER (OUTPATIENT)
Dept: PHYSICAL THERAPY | Age: 72
Discharge: HOME | End: 2017-05-18
Payer: COMMERCIAL

## 2017-05-18 PROCEDURE — 97110 THERAPEUTIC EXERCISES: CPT

## 2017-05-18 PROCEDURE — 97140 MANUAL THERAPY 1/> REGIONS: CPT

## 2017-05-18 NOTE — PROGRESS NOTES
Yumiko Chatterjee  : 1945 [facility] at [address] [place], [place] [address], [address] [address], [address] [facility]  [phone]   [phone]           OUTPATIENT PHYSICAL THERAPY:Daily Note 2017    ICD-10: Treatment Diagnosis: Unspecified rotator cuff tear or rupture of left shoulder, not specified as traumatic (M75.102); Pain in left shoulder (M25.512)  Precautions/Allergies:   Review of patient's allergies indicates no known allergies. Fall Risk Score: 1 (? 5 = High Risk)  MD Orders: Evaluate and Treat MEDICAL/REFERRING DIAGNOSIS:  Unspecified rotator cuff tear or rupture of left shoulder, not specified as traumatic [M75.102]   DATE OF ONSET: Surgery 03-15-17  REFERRING PHYSICIAN: Stone Timmons MD  RETURN PHYSICIAN APPOINTMENT: 17     INITIAL ASSESSMENT:  Mr. Kelsie Abarca presents with decreased ROM/strength L shoulder with increased pain leading to decreased functional status s/p L shoulder surgery 03-15-17. Pt would benefit from skilled physical therapy services to address the above deficits and help patient return to prior level of function. PROBLEM LIST (Impacting functional limitations):  1. Decreased Strength  2. Decreased ADL/Functional Activities  3. Increased Pain  4. Decreased Flexibility/Joint Mobility  5. Decreased [place] with Home Exercise Program INTERVENTIONS PLANNED:  1. Cold  2. Cryotherapy  3. Electrical Stimulation  4. Home Exercise Program (HEP)  5. Manual Therapy  6. Range of Motion (ROM)  7. Therapeutic Exercise/Strengthening   TREATMENT PLAN:  Effective Dates: 17 TO 17. Frequency/Duration: 2 times a week for 3 months  GOALS: (Goals have been discussed and agreed upon with patient.)  Short-Term Functional Goals: Time Frame: 4 weeks  1. Pt will increase PROM L shoulder flexion = 90 degrees, abduction = 90 degrees, external rotation = 70 degrees to assist with self care ADL's  2.  Pt will be independent with HEP  Discharge Goals: Time Frame: 12 weeks  1. Pt will increase ROM L shoulder flexion = 160 degrees, abduction = 160 degrees, external rotation = 80 degrees, internal rotation = 70 degrees to assist with self care and lawn activities  2. Pt will increase strength L shoulder 4/5 or greater to assist with self care and lawn activities  3. Pt will be independent with HEP  4. Pt will perform 20 minutes household and lawn activities independently with min to no c/o L shoulder pain  Rehabilitation Potential For Stated Goals: Good  Regarding Miguel Hernandez S Shahnaz's therapy, I certify that the treatment plan above will be carried out by a therapist or under their direction. Thank you for this referral,  Vincent Worley PT     Referring Physician Signature: Gabriel Tom MD              Date                    The information in this section was collected on 03-22-17 (except where otherwise noted). HISTORY:       History of Present Injury/Illness (Reason for Referral):  Pt reports insidious onset L shoulder pain of 6-8 months duration. He had an MRI which revealed a rotator cuff tear and surgery was scheduled. Pt had arthroscopic rotator cuff repair, arthroscopic distal clavicle excision (Benoit procedure), arthroscopic labral debridement and arthroscopic subacromial decompression, left shoulder on 03-15-17. He rates his current L shoulder pain 1/10 sitting at rest, increasing to 4/10 at worst over past couple of days. He is R-handed. Pt works as an . He is currently working part time/modified duty due to his L shoulder surgery. Pt is taking oxycodone 5 mg prn for pain. He took some pain meds prior to his PT session today, but had not taken any for about 3 days prior to today. He is currently having difficulties with all self care and household/lawn activities due to his L shoulder surgery.   Past Medical History/Comorbidities:   Mr. Benny Mora  has a past medical history of Arrhythmia; Enlarged prostate; H/O seasonal allergies; Hypercholesteremia; and Rotator cuff tear, left. Mr. Benny Mora  has a past surgical history that includes cervical diskectomy (05/2000); urological (1979); vasectomy (1970's); and colonoscopy. Social History/Living Environment:     Pt lives with wife who assists with ADL's as needed  Prior Level of Function/Work/Activity:  Independent with all work and household/lawn activities due to his L shoulder surgery  Dominant Side:         RIGHT  Other Clinical Tests:          MRI  Previous Treatment Approaches:          Surgery 03-15-17  Personal Factors:          Age:  67 y.o. Profession:  Pt works as an   Current Medications:       Current Outpatient Prescriptions:     finasteride (PROSCAR) 5 mg tablet, Take 5 mg by mouth every morning., Disp: , Rfl:     montelukast (SINGULAIR) 10 mg tablet, Take 10 mg by mouth nightly., Disp: , Rfl:     aspirin delayed-release 81 mg tablet, Take 81 mg by mouth every morning., Disp: , Rfl:     atorvastatin (LIPITOR) 40 mg tablet, Take 40 mg by mouth every morning., Disp: , Rfl:     Azelastine (ASTEPRO) 0.15 % (205.5 mcg) nasal spray, 2 Sprays by Both Nostrils route DIALYSIS PRN., Disp: , Rfl:     cyanocobalamin 1,000 mcg tablet, Take 1,000 mcg by mouth daily. , Disp: , Rfl:     Omega-3-DHA-EPA-Fish Oil (FISH OIL) 1,000 mg (120 mg-180 mg) cap, Take  by mouth two (2) times a day. Holding for suregey, Disp: , Rfl:     dronedarone (MULTAQ) tab tablet, Take 400 mg by mouth two (2) times daily (with meals). Indications: PAROXYSMAL ATRIAL FIBRILLATION, Disp: , Rfl:     multivitamin (ONE A DAY) tablet, Take 1 Tab by mouth daily. Holding till surgery, Disp: , Rfl:     silodosin (RAPAFLO) 4 mg capsule, Take 4 mg by mouth nightly., Disp: , Rfl:     sildenafil citrate (VIAGRA) 100 mg tablet, Take 100 mg by mouth as needed. , Disp: , Rfl:     cholecalciferol, vitamin D3, (VITAMIN D3) 2,000 unit tab, Take  by mouth.  Holding for surgery, Disp: , Rfl:    Date Last Reviewed:  03-22-17   Number of Personal Factors/Comorbidities that affect the Plan of Care: 1-2: MODERATE COMPLEXITY   EXAMINATION:   Observation/Orthostatic Postural Assessment: Forward head, rounded shoulders; L UE in sling  Palpation:          Generalized tenderness L shoulder  ROM: (PROM)    L shoulder flexion = 160 degrees  L shoulder abduction = 160 degrees  L shoulder external rotation = 80 degrees    Strength:      Not assessed    Special Tests:      Not assessed    Neurological Screen:        Sensation: Intact to light touch L UE  Functional Mobility:         Pt able to transition sit to supine and supine to sit independently    Body Structures Involved:  1. Bones  2. Joints  3. Muscles Body Functions Affected:  1. Sensory/Pain  2. Neuromusculoskeletal Activities and Participation Affected:  1. Mobility  2. Self Care  3. Domestic Life   Number of elements (examined above) that affect the Plan of Care: 3: MODERATE COMPLEXITY   CLINICAL PRESENTATION:   Presentation: Evolving clinical presentation with changing clinical characteristics: MODERATE COMPLEXITY   CLINICAL DECISION MAKING:   Outcome Measure: Tool Used: Disabilities of the Arm, Shoulder and Hand (DASH) Questionnaire - Quick Version  Score:  Initial: 45/55  Most Recent: X/55 (Date: -- )   Interpretation of Score: The DASH is designed to measure the activities of daily living in person's with upper extremity dysfunction or pain. Each section is scored on a 1-5 scale, 5 representing the greatest disability. The scores of each section are added together for a total score of 55. Medical Necessity:   · Patient is expected to demonstrate progress in strength, range of motion and functional technique to increase independence with self care, household and lawn activities. · Patient demonstrates good rehab potential due to higher previous functional level.   Reason for Services/Other Comments:  · Patient continues to require skilled intervention due to decreased ROM/strength L shoulder with increased pain leading to decreased functional status. Use of outcome tool(s) and clinical judgement create a POC that gives a: Questionable prediction of patient's progress: MODERATE COMPLEXITY            TREATMENT:   (In addition to Assessment/Re-Assessment sessions the following treatments were rendered)  Pre-treatment Symptoms/Complaints: Pt states his shoulder has felt better since decreasing his HEP frequency to once a day. He states he is sleeping better at night and not having discomfort when he reaches anymore. Pain: Initial:     0/10 Post Session:  0/10     THERAPEUTIC EXERCISE: (30 minutes):  Exercises per grid below to improve mobility and strength. Required minimal manual cues to promote proper body alignment and promote proper body posture. Progressed resistance, range and repetitions as indicated. MANUAL THERAPY: (10 minutes): PROM to L shoulder into flexion, abduction and external rotation was utilized and necessary because of the patient's restricted joint motion and restricted motion of soft tissue.     Date  05-15-17 Date  05-18-17   Activity/Exercise     S/L ER 2 Lbs  20 reps 2 Lbs  20 reps   Supine IR ER     Wall push ups 20 reps 20 reps   IR ER tband Blue  20 reps each Blue  20 reps each   Pulley's     Prone flexion-extension-scaption Ext and Rows  1 Lb  20 reps each Ext and Rows  1 lb  20 reps each  Flexion 0 Lb  20 reps   S/L abduction     C.L.O.C.K     UBE Manual L3  8 minutes Manual L3  8 minutes   Wall ladder-wall slide With Towel on Wall  20 reps With Towel on Wall  20 reps   Bicep curl 5 Lbs  20 reps 5 Lbs  20 reps   Scapular Squeezes-  Extension Tband Rows and Straight Arm Pulldowns  Blue   20 reps Rows and Straight Arm Pulldowns  Blue  20 reps   Pendulum Exercises (Circles CW/CCW)     Red ball roll up into flexion off wall     Standing Shoulder Flexion and Scaption to 90 degrees 1 Lb  15 reps each 1 Lb  15 reps each         Treatment/Session Assessment:    · Response to Treatment:   Pt tolerated treatments well today. Subjective improvement in symptoms over past few couple of days. · Compliance with Program/Exercises:Compliant  · Recommendations/Intent for next treatment session: \"Next visit will focus on ROM L shoulder\".   Total Treatment Duration: 40 minutes  PT Patient Time In/Time Out  Time In: 1300  Time Out: [address] [facility] Vincent Mackey PT

## 2017-05-22 ENCOUNTER — HOSPITAL ENCOUNTER (OUTPATIENT)
Dept: PHYSICAL THERAPY | Age: 72
Discharge: HOME | End: 2017-05-22
Payer: COMMERCIAL

## 2017-05-22 PROCEDURE — 97140 MANUAL THERAPY 1/> REGIONS: CPT

## 2017-05-22 PROCEDURE — 97110 THERAPEUTIC EXERCISES: CPT

## 2017-05-22 NOTE — PROGRESS NOTES
Blade Aislinn  : 1945 [facility] at [address] [address], [facility]  [phone]   [phone]           OUTPATIENT PHYSICAL THERAPY:Daily Note 2017    ICD-10: Treatment Diagnosis: Unspecified rotator cuff tear or rupture of left shoulder, not specified as traumatic (M75.102); Pain in left shoulder (M25.512)  Precautions/Allergies:   Review of patient's allergies indicates no known allergies. Fall Risk Score: 1 (? 5 = High Risk)  MD Orders: Evaluate and Treat MEDICAL/REFERRING DIAGNOSIS:  Unspecified rotator cuff tear or rupture of left shoulder, not specified as traumatic [M75.102]   DATE OF ONSET: Surgery 03-15-17  REFERRING PHYSICIAN: Margret Gil MD  RETURN PHYSICIAN APPOINTMENT: 17     INITIAL ASSESSMENT:  Mr. Christine Ventura presents with decreased ROM/strength L shoulder with increased pain leading to decreased functional status s/p L shoulder surgery 03-15-17. Pt would benefit from skilled physical therapy services to address the above deficits and help patient return to prior level of function. PROBLEM LIST (Impacting functional limitations):  1. Decreased Strength  2. Decreased ADL/Functional Activities  3. Increased Pain  4. Decreased Flexibility/Joint Mobility  5. Decreased [place] with Home Exercise Program INTERVENTIONS PLANNED:  1. Cold  2. Cryotherapy  3. Electrical Stimulation  4. Home Exercise Program (HEP)  5. Manual Therapy  6. Range of Motion (ROM)  7. Therapeutic Exercise/Strengthening   TREATMENT PLAN:  Effective Dates: 17 TO 17. Frequency/Duration: 2 times a week for 3 months  GOALS: (Goals have been discussed and agreed upon with patient.)  Short-Term Functional Goals: Time Frame: 4 weeks  1. Pt will increase PROM L shoulder flexion = 90 degrees, abduction = 90 degrees, external rotation = 70 degrees to assist with self care ADL's  2.  Pt will be independent with HEP  Discharge Goals: Time Frame: 12 weeks  1. Pt will increase ROM L shoulder flexion = 160 degrees, abduction = 160 degrees, external rotation = 80 degrees, internal rotation = 70 degrees to assist with self care and lawn activities  2. Pt will increase strength L shoulder 4/5 or greater to assist with self care and lawn activities  3. Pt will be independent with HEP  4. Pt will perform 20 minutes household and lawn activities independently with min to no c/o L shoulder pain  Rehabilitation Potential For Stated Goals: Good  Regarding Stefano Medrano S Shahnaz's therapy, I certify that the treatment plan above will be carried out by a therapist or under their direction. Thank you for this referral,  oJse Garcia PTA     Referring Physician Signature: Analilia Rodriguez MD              Date                    The information in this section was collected on 03-22-17 (except where otherwise noted). HISTORY:       History of Present Injury/Illness (Reason for Referral):  Pt reports insidious onset L shoulder pain of 6-8 months duration. He had an MRI which revealed a rotator cuff tear and surgery was scheduled. Pt had arthroscopic rotator cuff repair, arthroscopic distal clavicle excision (Benoit procedure), arthroscopic labral debridement and arthroscopic subacromial decompression, left shoulder on 03-15-17. He rates his current L shoulder pain 1/10 sitting at rest, increasing to 4/10 at worst over past couple of days. He is R-handed. Pt works as an . He is currently working part time/modified duty due to his L shoulder surgery. Pt is taking oxycodone 5 mg prn for pain. He took some pain meds prior to his PT session today, but had not taken any for about 3 days prior to today. He is currently having difficulties with all self care and household/lawn activities due to his L shoulder surgery.   Past Medical History/Comorbidities:   Mr. Chelsy Miguel  has a past medical history of Arrhythmia; Enlarged prostate; H/O seasonal allergies; Hypercholesteremia; and Rotator cuff tear, left. Mr. Ramila Trujillo  has a past surgical history that includes cervical diskectomy (05/2000); urological (1979); vasectomy (1970's); and colonoscopy. Social History/Living Environment:     Pt lives with wife who assists with ADL's as needed  Prior Level of Function/Work/Activity:  Independent with all work and household/lawn activities due to his L shoulder surgery  Dominant Side:         RIGHT  Other Clinical Tests:          MRI  Previous Treatment Approaches:          Surgery 03-15-17  Personal Factors:          Age:  67 y.o. Profession:  Pt works as an   Current Medications:       Current Outpatient Prescriptions:     finasteride (PROSCAR) 5 mg tablet, Take 5 mg by mouth every morning., Disp: , Rfl:     montelukast (SINGULAIR) 10 mg tablet, Take 10 mg by mouth nightly., Disp: , Rfl:     aspirin delayed-release 81 mg tablet, Take 81 mg by mouth every morning., Disp: , Rfl:     atorvastatin (LIPITOR) 40 mg tablet, Take 40 mg by mouth every morning., Disp: , Rfl:     Azelastine (ASTEPRO) 0.15 % (205.5 mcg) nasal spray, 2 Sprays by Both Nostrils route DIALYSIS PRN., Disp: , Rfl:     cyanocobalamin 1,000 mcg tablet, Take 1,000 mcg by mouth daily. , Disp: , Rfl:     Omega-3-DHA-EPA-Fish Oil (FISH OIL) 1,000 mg (120 mg-180 mg) cap, Take  by mouth two (2) times a day. Holding for suregey, Disp: , Rfl:     dronedarone (MULTAQ) tab tablet, Take 400 mg by mouth two (2) times daily (with meals). Indications: PAROXYSMAL ATRIAL FIBRILLATION, Disp: , Rfl:     multivitamin (ONE A DAY) tablet, Take 1 Tab by mouth daily. Holding till surgery, Disp: , Rfl:     silodosin (RAPAFLO) 4 mg capsule, Take 4 mg by mouth nightly., Disp: , Rfl:     sildenafil citrate (VIAGRA) 100 mg tablet, Take 100 mg by mouth as needed. , Disp: , Rfl:     cholecalciferol, vitamin D3, (VITAMIN D3) 2,000 unit tab, Take  by mouth.  Holding for surgery, Disp: , Rfl:    Date Last Reviewed:  03-22-17   Number of Personal Factors/Comorbidities that affect the Plan of Care: 1-2: MODERATE COMPLEXITY   EXAMINATION:   Observation/Orthostatic Postural Assessment: Forward head, rounded shoulders; L UE in sling  Palpation:          Generalized tenderness L shoulder  ROM: (PROM)    L shoulder flexion = 160 degrees  L shoulder abduction = 160 degrees  L shoulder external rotation = 80 degrees    Strength:      Not assessed    Special Tests:      Not assessed    Neurological Screen:        Sensation: Intact to light touch L UE  Functional Mobility:         Pt able to transition sit to supine and supine to sit independently    Body Structures Involved:  1. Bones  2. Joints  3. Muscles Body Functions Affected:  1. Sensory/Pain  2. Neuromusculoskeletal Activities and Participation Affected:  1. Mobility  2. Self Care  3. Domestic Life   Number of elements (examined above) that affect the Plan of Care: 3: MODERATE COMPLEXITY   CLINICAL PRESENTATION:   Presentation: Evolving clinical presentation with changing clinical characteristics: MODERATE COMPLEXITY   CLINICAL DECISION MAKING:   Outcome Measure: Tool Used: Disabilities of the Arm, Shoulder and Hand (DASH) Questionnaire - Quick Version  Score:  Initial: 45/55  Most Recent: X/55 (Date: -- )   Interpretation of Score: The DASH is designed to measure the activities of daily living in person's with upper extremity dysfunction or pain. Each section is scored on a 1-5 scale, 5 representing the greatest disability. The scores of each section are added together for a total score of 55. Medical Necessity:   · Patient is expected to demonstrate progress in strength, range of motion and functional technique to increase independence with self care, household and lawn activities. · Patient demonstrates good rehab potential due to higher previous functional level.   Reason for Services/Other Comments:  · Patient continues to require skilled intervention due to decreased ROM/strength L shoulder with increased pain leading to decreased functional status. Use of outcome tool(s) and clinical judgement create a POC that gives a: Questionable prediction of patient's progress: MODERATE COMPLEXITY            TREATMENT:   (In addition to Assessment/Re-Assessment sessions the following treatments were rendered)  Pre-treatment Symptoms/Complaints: \"we did a lot of yard work at the [address] this past weekend, I was sore all over, I didn't lift anything heavy with this left arm\"  Pain: Initial:     0/10 Post Session:  0/10     THERAPEUTIC EXERCISE: (30 minutes):  Exercises per grid below to improve mobility and strength. Required minimal manual cues to promote proper body alignment and promote proper body posture. Progressed resistance, range and repetitions as indicated. MANUAL THERAPY: (10 minutes): PROM to L shoulder into flexion, abduction and external rotation was utilized and necessary because of the patient's restricted joint motion and restricted motion of soft tissue. Date  05-22-17 Date     Activity/Exercise     S/L ER 2 Lbs  20 reps    Supine IR ER #2 x 15 reps    Wall push ups 20 reps    IR ER tband Blue  20 reps each    Pulley's     Prone flexion-extension-scaption Ext and Rows  1 Lb  20 reps each    S/L abduction #3 x 15 reps    Standing Flexion-Scaption #1 x 12 reps each    C.L.O.C.K Yellow x 10 reps     UBE Manual L3  8 minutes    Wall Push Ups X 15 reps    Abduction across body Yellow x 15 reps Left UE    Bicep curl 5 Lbs  20 reps    Scapular Squeezes-  Extension Tband Rows and Straight Arm Pulldowns  Blue   20 reps    Pendulum Exercises (Circles CW/CCW) x    Standing Wand flexion X 15 reps    Standing Shoulder Flexion and Scaption to 90 degrees 1 Lb  15 reps each      Ice to Left shoulder after tx today 10 minutes. Treatment/Session Assessment:    · Response to Treatment:   Pt tolerated treatments well today.   Patient continue's to increase with strength and ROM. Continue plan of care. · Compliance with Program/Exercises:Compliant  · Recommendations/Intent for next treatment session: \"Next visit will focus on ROM L shoulder\".   Total Treatment Duration: 45 minutes  PT Patient Time In/Time Out  Time In: 1430  Time Out: Emanuel 141, PTA

## 2017-05-25 ENCOUNTER — HOSPITAL ENCOUNTER (OUTPATIENT)
Dept: PHYSICAL THERAPY | Age: 72
Discharge: HOME | End: 2017-05-25
Payer: COMMERCIAL

## 2017-05-25 PROCEDURE — 97110 THERAPEUTIC EXERCISES: CPT

## 2017-05-25 PROCEDURE — 97140 MANUAL THERAPY 1/> REGIONS: CPT

## 2017-05-25 NOTE — PROGRESS NOTES
Karishma Jaquez  : 1945 [facility]  [address], [address] [place] [address], [facility] [address]  [phone]   [phone]           OUTPATIENT PHYSICAL THERAPY:Daily Note 2017    ICD-10: Treatment Diagnosis: Unspecified rotator cuff tear or rupture of left shoulder, not specified as traumatic (M75.102); Pain in left shoulder (M25.512)  Precautions/Allergies:   Review of patient's allergies indicates no known allergies. Fall Risk Score: 1 (? 5 = High Risk)  MD Orders: Evaluate and Treat MEDICAL/REFERRING DIAGNOSIS:  Unspecified rotator cuff tear or rupture of left shoulder, not specified as traumatic [M75.102]   DATE OF ONSET: Surgery 03-15-17  REFERRING PHYSICIAN: Shasha Amaya MD  RETURN PHYSICIAN APPOINTMENT: 17     INITIAL ASSESSMENT:  Mr. Buddy Hernandez presents with decreased ROM/strength L shoulder with increased pain leading to decreased functional status s/p L shoulder surgery 03-15-17. Pt would benefit from skilled physical therapy services to address the above deficits and help patient return to prior level of function. PROBLEM LIST (Impacting functional limitations):  1. Decreased Strength  2. Decreased ADL/Functional Activities  3. Increased Pain  4. Decreased Flexibility/Joint Mobility  5. Decreased [place] with Home Exercise Program INTERVENTIONS PLANNED:  1. Cold  2. Cryotherapy  3. Electrical Stimulation  4. Home Exercise Program (HEP)  5. Manual Therapy  6. Range of Motion (ROM)  7. Therapeutic Exercise/Strengthening   TREATMENT PLAN:  Effective Dates: 17 TO 17. Frequency/Duration: 2 times a week for 3 months  GOALS: (Goals have been discussed and agreed upon with patient.)  Short-Term Functional Goals: Time Frame: 4 weeks  1. Pt will increase PROM L shoulder flexion = 90 degrees, abduction = 90 degrees, external rotation = 70 degrees to assist with self care ADL's  2.  Pt will be independent with HEP  Discharge Goals: Time Frame: 12 weeks  1. Pt will increase ROM L shoulder flexion = 160 degrees, abduction = 160 degrees, external rotation = 80 degrees, internal rotation = 70 degrees to assist with self care and lawn activities  2. Pt will increase strength L shoulder 4/5 or greater to assist with self care and lawn activities  3. Pt will be independent with HEP  4. Pt will perform 20 minutes household and lawn activities independently with min to no c/o L shoulder pain  Rehabilitation Potential For Stated Goals: Good  Regarding Lavena Ganser S Shahnaz's therapy, I certify that the treatment plan above will be carried out by a therapist or under their direction. Thank you for this referral,  Yoana Lr, PT     Referring Physician Signature: Td Thompson MD              Date                    The information in this section was collected on 03-22-17 (except where otherwise noted). HISTORY:       History of Present Injury/Illness (Reason for Referral):  Pt reports insidious onset L shoulder pain of 6-8 months duration. He had an MRI which revealed a rotator cuff tear and surgery was scheduled. Pt had arthroscopic rotator cuff repair, arthroscopic distal clavicle excision (Benoit procedure), arthroscopic labral debridement and arthroscopic subacromial decompression, left shoulder on 03-15-17. He rates his current L shoulder pain 1/10 sitting at rest, increasing to 4/10 at worst over past couple of days. He is R-handed. Pt works as an . He is currently working part time/modified duty due to his L shoulder surgery. Pt is taking oxycodone 5 mg prn for pain. He took some pain meds prior to his PT session today, but had not taken any for about 3 days prior to today. He is currently having difficulties with all self care and household/lawn activities due to his L shoulder surgery.   Past Medical History/Comorbidities:   Mr. Zhanna Verdugo  has a past medical history of Arrhythmia; Enlarged prostate; H/O seasonal allergies; Hypercholesteremia; and Rotator cuff tear, left. Mr. Narciso Menendez  has a past surgical history that includes cervical diskectomy (05/2000); urological (1979); vasectomy (1970's); and colonoscopy. Social History/Living Environment:     Pt lives with wife who assists with ADL's as needed  Prior Level of Function/Work/Activity:  Independent with all work and household/lawn activities due to his L shoulder surgery  Dominant Side:         RIGHT  Other Clinical Tests:          MRI  Previous Treatment Approaches:          Surgery 03-15-17  Personal Factors:          Age:  67 y.o. Profession:  Pt works as an   Current Medications:       Current Outpatient Prescriptions:     finasteride (PROSCAR) 5 mg tablet, Take 5 mg by mouth every morning., Disp: , Rfl:     montelukast (SINGULAIR) 10 mg tablet, Take 10 mg by mouth nightly., Disp: , Rfl:     aspirin delayed-release 81 mg tablet, Take 81 mg by mouth every morning., Disp: , Rfl:     atorvastatin (LIPITOR) 40 mg tablet, Take 40 mg by mouth every morning., Disp: , Rfl:     Azelastine (ASTEPRO) 0.15 % (205.5 mcg) nasal spray, 2 Sprays by Both Nostrils route DIALYSIS PRN., Disp: , Rfl:     cyanocobalamin 1,000 mcg tablet, Take 1,000 mcg by mouth daily. , Disp: , Rfl:     Omega-3-DHA-EPA-Fish Oil (FISH OIL) 1,000 mg (120 mg-180 mg) cap, Take  by mouth two (2) times a day. Holding for suregey, Disp: , Rfl:     dronedarone (MULTAQ) tab tablet, Take 400 mg by mouth two (2) times daily (with meals). Indications: PAROXYSMAL ATRIAL FIBRILLATION, Disp: , Rfl:     multivitamin (ONE A DAY) tablet, Take 1 Tab by mouth daily. Holding till surgery, Disp: , Rfl:     silodosin (RAPAFLO) 4 mg capsule, Take 4 mg by mouth nightly., Disp: , Rfl:     sildenafil citrate (VIAGRA) 100 mg tablet, Take 100 mg by mouth as needed. , Disp: , Rfl:     cholecalciferol, vitamin D3, (VITAMIN D3) 2,000 unit tab, Take  by mouth.  Holding for surgery, Disp: , Rfl:    Date Last Reviewed:  03-22-17   Number of Personal Factors/Comorbidities that affect the Plan of Care: 1-2: MODERATE COMPLEXITY   EXAMINATION:   Observation/Orthostatic Postural Assessment: Forward head, rounded shoulders; L UE in sling  Palpation:          Generalized tenderness L shoulder  ROM: (PROM)    L shoulder flexion = 160 degrees  L shoulder abduction = 160 degrees  L shoulder external rotation = 80 degrees    Strength:      Not assessed    Special Tests:      Not assessed    Neurological Screen:        Sensation: Intact to light touch L UE  Functional Mobility:         Pt able to transition sit to supine and supine to sit independently    Body Structures Involved:  1. Bones  2. Joints  3. Muscles Body Functions Affected:  1. Sensory/Pain  2. Neuromusculoskeletal Activities and Participation Affected:  1. Mobility  2. Self Care  3. Domestic Life   Number of elements (examined above) that affect the Plan of Care: 3: MODERATE COMPLEXITY   CLINICAL PRESENTATION:   Presentation: Evolving clinical presentation with changing clinical characteristics: MODERATE COMPLEXITY   CLINICAL DECISION MAKING:   Outcome Measure: Tool Used: Disabilities of the Arm, Shoulder and Hand (DASH) Questionnaire - Quick Version  Score:  Initial: 45/55  Most Recent: X/55 (Date: -- )   Interpretation of Score: The DASH is designed to measure the activities of daily living in person's with upper extremity dysfunction or pain. Each section is scored on a 1-5 scale, 5 representing the greatest disability. The scores of each section are added together for a total score of 55. Medical Necessity:   · Patient is expected to demonstrate progress in strength, range of motion and functional technique to increase independence with self care, household and lawn activities. · Patient demonstrates good rehab potential due to higher previous functional level.   Reason for Services/Other Comments:  · Patient continues to require skilled intervention due to decreased ROM/strength L shoulder with increased pain leading to decreased functional status. Use of outcome tool(s) and clinical judgement create a POC that gives a: Questionable prediction of patient's progress: MODERATE COMPLEXITY            TREATMENT:   (In addition to Assessment/Re-Assessment sessions the following treatments were rendered)  Pre-treatment Symptoms/Complaints: Pt states his shoulder continues to make progress. Pain: Initial:     0/10 Post Session:  0/10     THERAPEUTIC EXERCISE: (30 minutes):  Exercises per grid below to improve mobility and strength. Required minimal manual cues to promote proper body alignment and promote proper body posture. Progressed resistance, range and repetitions as indicated. MANUAL THERAPY: (10 minutes): PROM to L shoulder into flexion, abduction and external rotation was utilized and necessary because of the patient's restricted joint motion and restricted motion of soft tissue. Date  05-22-17 Date  05-25-17   Activity/Exercise     S/L ER 2 Lbs  20 reps 3 Lbs  20 reps   Wall push ups 20 reps 20 reps   IR ER tband Blue  20 reps each Blue  20 reps each   Prone flexion-extension-scaption Ext and Rows  1 Lb  20 reps each Ext and Rows  2 Lbs  Scaption  1 Lb  20 reps each   S/L abduction #3 x 15 reps    C.L.O.C.K Yellow x 10 reps     UBE Manual L3  8 minutes Manual L3  8 minutes   Abduction across body Yellow x 15 reps Left UE    Bicep curl 5 Lbs  20 reps 5 Lbs  20 reps   Scapular Squeezes-  Extension Tband Rows and Straight Arm Pulldowns  Blue   20 reps Rows and Straight Arm Pulldowns  Blue  20 reps each   Standing Wand flexion X 15 reps    Standing Shoulder Flexion and Scaption to 90 degrees 1 Lb  15 reps each 2 Lbs  15 reps each   Wall Towel Slides  20 reps       Treatment/Session Assessment:    · Response to Treatment:   Pt tolerated treatments well today. ROM/strength/pain continue to improve.   · Compliance with Program/Exercises:Compliant  · Recommendations/Intent for next treatment session: \"Next visit will focus on ROM L shoulder\".   Total Treatment Duration: 40 minutes  PT Patient Time In/Time Out  Time In: 1500  Time Out: R Mario Angel 75, PT

## 2017-05-30 ENCOUNTER — HOSPITAL ENCOUNTER (OUTPATIENT)
Dept: PHYSICAL THERAPY | Age: 72
Discharge: HOME | End: 2017-05-30
Payer: COMMERCIAL

## 2017-05-30 NOTE — PROGRESS NOTES
Pt showed up at wrong appt time. Will follow up with him at next scheduled visit.     Arsh Parikh PT
No

## 2017-06-01 ENCOUNTER — HOSPITAL ENCOUNTER (OUTPATIENT)
Dept: PHYSICAL THERAPY | Age: 72
Discharge: HOME | End: 2017-06-01
Payer: COMMERCIAL

## 2017-06-01 PROCEDURE — 97110 THERAPEUTIC EXERCISES: CPT

## 2017-06-01 PROCEDURE — 97140 MANUAL THERAPY 1/> REGIONS: CPT

## 2017-06-01 NOTE — PROGRESS NOTES
Stephanie Huitron  : 1945 [facility] [place] [address], [address] [facility]  [phone]   [phone]           OUTPATIENT PHYSICAL THERAPY:Daily Note 2017    ICD-10: Treatment Diagnosis: Unspecified rotator cuff tear or rupture of left shoulder, not specified as traumatic (M75.102); Pain in left shoulder (M25.512)  Precautions/Allergies:   Review of patient's allergies indicates no known allergies. Fall Risk Score: 1 (? 5 = High Risk)  MD Orders: Evaluate and Treat MEDICAL/REFERRING DIAGNOSIS:  Unspecified rotator cuff tear or rupture of left shoulder, not specified as traumatic [M75.102]   DATE OF ONSET: Surgery 03-15-17  REFERRING PHYSICIAN: Zarina Chaidez MD  RETURN PHYSICIAN APPOINTMENT: 17     INITIAL ASSESSMENT:  Mr. Shameka Anderson presents with decreased ROM/strength L shoulder with increased pain leading to decreased functional status s/p L shoulder surgery 03-15-17. Pt would benefit from skilled physical therapy services to address the above deficits and help patient return to prior level of function. PROBLEM LIST (Impacting functional limitations):  1. Decreased Strength  2. Decreased ADL/Functional Activities  3. Increased Pain  4. Decreased Flexibility/Joint Mobility  5. Decreased [place] with Home Exercise Program INTERVENTIONS PLANNED:  1. Cold  2. Cryotherapy  3. Electrical Stimulation  4. Home Exercise Program (HEP)  5. Manual Therapy  6. Range of Motion (ROM)  7. Therapeutic Exercise/Strengthening   TREATMENT PLAN:  Effective Dates: 17 TO 17. Frequency/Duration: 2 times a week for 3 months  GOALS: (Goals have been discussed and agreed upon with patient.)  Short-Term Functional Goals: Time Frame: 4 weeks  1. Pt will increase PROM L shoulder flexion = 90 degrees, abduction = 90 degrees, external rotation = 70 degrees to assist with self care ADL's  2.  Pt will be independent with HEP  Discharge Goals: Time Frame: 12 weeks  1. Pt will increase ROM L shoulder flexion = 160 degrees, abduction = 160 degrees, external rotation = 80 degrees, internal rotation = 70 degrees to assist with self care and lawn activities  2. Pt will increase strength L shoulder 4/5 or greater to assist with self care and lawn activities  3. Pt will be independent with HEP  4. Pt will perform 20 minutes household and lawn activities independently with min to no c/o L shoulder pain  Rehabilitation Potential For Stated Goals: Good  Regarding Fidelia Christie's therapy, I certify that the treatment plan above will be carried out by a therapist or under their direction. Thank you for this referral,  Nora Gregory PT     Referring Physician Signature: Zarina Chaidez MD              Date                    The information in this section was collected on 03-22-17 (except where otherwise noted). HISTORY:       History of Present Injury/Illness (Reason for Referral):  Pt reports insidious onset L shoulder pain of 6-8 months duration. He had an MRI which revealed a rotator cuff tear and surgery was scheduled. Pt had arthroscopic rotator cuff repair, arthroscopic distal clavicle excision (Benoit procedure), arthroscopic labral debridement and arthroscopic subacromial decompression, left shoulder on 03-15-17. He rates his current L shoulder pain 1/10 sitting at rest, increasing to 4/10 at worst over past couple of days. He is R-handed. Pt works as an . He is currently working part time/modified duty due to his L shoulder surgery. Pt is taking oxycodone 5 mg prn for pain. He took some pain meds prior to his PT session today, but had not taken any for about 3 days prior to today. He is currently having difficulties with all self care and household/lawn activities due to his L shoulder surgery.   Past Medical History/Comorbidities:   Mr. Shameka Anderson  has a past medical history of Arrhythmia; Enlarged prostate; H/O seasonal allergies; Hypercholesteremia; and Rotator cuff tear, left. Mr. Buddy Hernandez  has a past surgical history that includes cervical diskectomy (05/2000); urological (1979); vasectomy (1970's); and colonoscopy. Social History/Living Environment:     Pt lives with wife who assists with ADL's as needed  Prior Level of Function/Work/Activity:  Independent with all work and household/lawn activities due to his L shoulder surgery  Dominant Side:         RIGHT  Other Clinical Tests:          MRI  Previous Treatment Approaches:          Surgery 03-15-17  Personal Factors:          Age:  67 y.o. Profession:  Pt works as an   Current Medications:       Current Outpatient Prescriptions:     finasteride (PROSCAR) 5 mg tablet, Take 5 mg by mouth every morning., Disp: , Rfl:     montelukast (SINGULAIR) 10 mg tablet, Take 10 mg by mouth nightly., Disp: , Rfl:     aspirin delayed-release 81 mg tablet, Take 81 mg by mouth every morning., Disp: , Rfl:     atorvastatin (LIPITOR) 40 mg tablet, Take 40 mg by mouth every morning., Disp: , Rfl:     Azelastine (ASTEPRO) 0.15 % (205.5 mcg) nasal spray, 2 Sprays by Both Nostrils route DIALYSIS PRN., Disp: , Rfl:     cyanocobalamin 1,000 mcg tablet, Take 1,000 mcg by mouth daily. , Disp: , Rfl:     Omega-3-DHA-EPA-Fish Oil (FISH OIL) 1,000 mg (120 mg-180 mg) cap, Take  by mouth two (2) times a day. Holding for suregey, Disp: , Rfl:     dronedarone (MULTAQ) tab tablet, Take 400 mg by mouth two (2) times daily (with meals). Indications: PAROXYSMAL ATRIAL FIBRILLATION, Disp: , Rfl:     multivitamin (ONE A DAY) tablet, Take 1 Tab by mouth daily. Holding till surgery, Disp: , Rfl:     silodosin (RAPAFLO) 4 mg capsule, Take 4 mg by mouth nightly., Disp: , Rfl:     sildenafil citrate (VIAGRA) 100 mg tablet, Take 100 mg by mouth as needed. , Disp: , Rfl:     cholecalciferol, vitamin D3, (VITAMIN D3) 2,000 unit tab, Take  by mouth.  Holding for surgery, Disp: , Rfl:    Date Last Reviewed:  03-22-17   Number of Personal Factors/Comorbidities that affect the Plan of Care: 1-2: MODERATE COMPLEXITY   EXAMINATION:   Observation/Orthostatic Postural Assessment: Forward head, rounded shoulders; L UE in sling  Palpation:          Generalized tenderness L shoulder  ROM: (PROM)    L shoulder flexion = 160 degrees  L shoulder abduction = 160 degrees  L shoulder external rotation = 80 degrees    Strength:      Not assessed    Special Tests:      Not assessed    Neurological Screen:        Sensation: Intact to light touch L UE  Functional Mobility:         Pt able to transition sit to supine and supine to sit independently    Body Structures Involved:  1. Bones  2. Joints  3. Muscles Body Functions Affected:  1. Sensory/Pain  2. Neuromusculoskeletal Activities and Participation Affected:  1. Mobility  2. Self Care  3. Domestic Life   Number of elements (examined above) that affect the Plan of Care: 3: MODERATE COMPLEXITY   CLINICAL PRESENTATION:   Presentation: Evolving clinical presentation with changing clinical characteristics: MODERATE COMPLEXITY   CLINICAL DECISION MAKING:   Outcome Measure: Tool Used: Disabilities of the Arm, Shoulder and Hand (DASH) Questionnaire - Quick Version  Score:  Initial: 45/55  Most Recent: X/55 (Date: -- )   Interpretation of Score: The DASH is designed to measure the activities of daily living in person's with upper extremity dysfunction or pain. Each section is scored on a 1-5 scale, 5 representing the greatest disability. The scores of each section are added together for a total score of 55. Medical Necessity:   · Patient is expected to demonstrate progress in strength, range of motion and functional technique to increase independence with self care, household and lawn activities. · Patient demonstrates good rehab potential due to higher previous functional level.   Reason for Services/Other Comments:  · Patient continues to require skilled intervention due to decreased ROM/strength L shoulder with increased pain leading to decreased functional status. Use of outcome tool(s) and clinical judgement create a POC that gives a: Questionable prediction of patient's progress: MODERATE COMPLEXITY            TREATMENT:   (In addition to Assessment/Re-Assessment sessions the following treatments were rendered)  Pre-treatment Symptoms/Complaints: Pt states his shoulder continues to make progress. Pain: Initial:     0/10 Post Session:  0/10     THERAPEUTIC EXERCISE: (30 minutes):  Exercises per grid below to improve mobility and strength. Required minimal manual cues to promote proper body alignment and promote proper body posture. Progressed resistance, range and repetitions as indicated. MANUAL THERAPY: (10 minutes): PROM to L shoulder into flexion, abduction and external rotation was utilized and necessary because of the patient's restricted joint motion and restricted motion of soft tissue.     Date  05-22-17 Date  05-25-17 Date  06-01-17   Activity/Exercise      S/L ER 2 Lbs  20 reps 3 Lbs  20 reps 3 Lbs  20 reps   Wall push ups 20 reps 20 reps 20 reps   IR ER tband Blue  20 reps each Blue  20 reps each Blue  20 reps each   Prone flexion-extension-scaption Ext and Rows  1 Lb  20 reps each Ext and Rows  2 Lbs  Scaption  1 Lb  20 reps each Ext and Rows  2 Lbs  Scaption  1 Lb  20 reps each   S/L abduction #3 x 15 reps     C.L.O.C.K Yellow x 10 reps   Yellow  15 reps each   UBE Manual L3  8 minutes Manual L3  8 minutes Manual L4  8 minutes   Abduction across body Yellow x 15 reps Left UE     Bicep curl 5 Lbs  20 reps 5 Lbs  20 reps 5 Lbs  20 reps   Scapular Squeezes-  Extension Tband Rows and Straight Arm Pulldowns  Blue   20 reps Rows and Straight Arm Pulldowns  Blue  20 reps each Rows and Straight Arm Pulldowns  Blue  20 reps each   Standing Wand flexion X 15 reps     Standing Shoulder Flexion and Scaption to 90 degrees 1 Lb  15 reps each 2 Lbs  15 reps each 2 Lbs  15 reps each   Wall Towel Slides  20 reps        Treatment/Session Assessment:    · Response to Treatment:   Pt tolerated treatments well today. He is to see MD for follow up next week and will call us after appt to let us know if MD wants him to continue any further PT.  · Compliance with Program/Exercises:Compliant  · Recommendations/Intent for next treatment session: \"Next visit will focus on ROM L shoulder\".   Total Treatment Duration: 40 minutes  PT Patient Time In/Time Out  Time In: 6347  Time Out: 54428 Matheus Jules, PT

## 2017-07-26 NOTE — PROGRESS NOTES
Miriam Carmichael  : 1945 [facility] [address], [address] [place] [address],[address], [place] [address].  [phone]   [phone]           OUTPATIENT PHYSICAL Sunnie Ganser 2017    ICD-10: Treatment Diagnosis: Unspecified rotator cuff tear or rupture of left shoulder, not specified as traumatic (M75.102); Pain in left shoulder (M25.512)  Precautions/Allergies:   Review of patient's allergies indicates no known allergies. Fall Risk Score: 1 (? 5 = High Risk)  MD Orders: Evaluate and Treat MEDICAL/REFERRING DIAGNOSIS:  Unspecified rotator cuff tear or rupture of left shoulder, not specified as traumatic [M75.102]   DATE OF ONSET: Surgery 03-15-17  REFERRING PHYSICIAN: Bette Marin MD  RETURN PHYSICIAN APPOINTMENT: 17     ASSESSMENT:  Mr. Jaycob Skelton was last seen for PT on 17. He returned to MD for follow up and was not referred back for any further PT. Discharge from PT to independent HEP. PROBLEM LIST (Impacting functional limitations):  1. Decreased Strength  2. Decreased ADL/Functional Activities  3. Increased Pain  4. Decreased Flexibility/Joint Mobility  5. Decreased Lena with Home Exercise Program INTERVENTIONS PLANNED:  1. Cold  2. Cryotherapy  3. Electrical Stimulation  4. Home Exercise Program (HEP)  5. Manual Therapy  6. Range of Motion (ROM)  7. Therapeutic Exercise/Strengthening   TREATMENT PLAN:  Effective Dates: 17 TO 17. Frequency/Duration: 2 times a week for 3 months  GOALS: (Goals have been discussed and agreed upon with patient.)  Short-Term Functional Goals: Time Frame: 4 weeks  1. Pt will increase PROM L shoulder flexion = 90 degrees, abduction = 90 degrees, external rotation = 70 degrees to assist with self care ADL's  2. Pt will be independent with HEP  Discharge Goals: Time Frame: 12 weeks  1.  Pt will increase ROM L shoulder flexion = 160 degrees, abduction = 160 degrees, external rotation = 80 degrees, internal rotation = 70 degrees to assist with self care and lawn activities  2. Pt will increase strength L shoulder 4/5 or greater to assist with self care and lawn activities  3. Pt will be independent with HEP  4. Pt will perform 20 minutes household and lawn activities independently with min to no c/o L shoulder pain  Rehabilitation Potential For Stated Goals: Good  Regarding Mary Ann Prado S Shahnaz's therapy, I certify that the treatment plan above will be carried out by a therapist or under their direction. Thank you for this referral,  Gianni Mauricio PT     Referring Physician Signature: Mary Mack MD              Date                    The information in this section was collected on 03-22-17 (except where otherwise noted). HISTORY:       History of Present Injury/Illness (Reason for Referral):  Pt reports insidious onset L shoulder pain of 6-8 months duration. He had an MRI which revealed a rotator cuff tear and surgery was scheduled. Pt had arthroscopic rotator cuff repair, arthroscopic distal clavicle excision (Benoit procedure), arthroscopic labral debridement and arthroscopic subacromial decompression, left shoulder on 03-15-17. He rates his current L shoulder pain 1/10 sitting at rest, increasing to 4/10 at worst over past couple of days. He is R-handed. Pt works as an . He is currently working part time/modified duty due to his L shoulder surgery. Pt is taking oxycodone 5 mg prn for pain. He took some pain meds prior to his PT session today, but had not taken any for about 3 days prior to today. He is currently having difficulties with all self care and household/lawn activities due to his L shoulder surgery. Past Medical History/Comorbidities:   Mr. Judy Brody  has a past medical history of Arrhythmia; Enlarged prostate; H/O seasonal allergies; Hypercholesteremia; and Rotator cuff tear, left.   Mr. Judy Brody  has a past surgical history that includes cervical diskectomy (05/2000); urological (1979); vasectomy (1970's); and colonoscopy. Social History/Living Environment:     Pt lives with wife who assists with ADL's as needed  Prior Level of Function/Work/Activity:  Independent with all work and household/lawn activities due to his L shoulder surgery  Dominant Side:         RIGHT  Other Clinical Tests:          MRI  Previous Treatment Approaches:          Surgery 03-15-17  Personal Factors:          Age:  67 y.o. Profession:  Pt works as an   Current Medications:       Current Outpatient Prescriptions:     finasteride (PROSCAR) 5 mg tablet, Take 5 mg by mouth every morning., Disp: , Rfl:     montelukast (SINGULAIR) 10 mg tablet, Take 10 mg by mouth nightly., Disp: , Rfl:     aspirin delayed-release 81 mg tablet, Take 81 mg by mouth every morning., Disp: , Rfl:     atorvastatin (LIPITOR) 40 mg tablet, Take 40 mg by mouth every morning., Disp: , Rfl:     Azelastine (ASTEPRO) 0.15 % (205.5 mcg) nasal spray, 2 Sprays by Both Nostrils route DIALYSIS PRN., Disp: , Rfl:     cyanocobalamin 1,000 mcg tablet, Take 1,000 mcg by mouth daily. , Disp: , Rfl:     Omega-3-DHA-EPA-Fish Oil (FISH OIL) 1,000 mg (120 mg-180 mg) cap, Take  by mouth two (2) times a day. Holding for suregey, Disp: , Rfl:     dronedarone (MULTAQ) tab tablet, Take 400 mg by mouth two (2) times daily (with meals). Indications: PAROXYSMAL ATRIAL FIBRILLATION, Disp: , Rfl:     multivitamin (ONE A DAY) tablet, Take 1 Tab by mouth daily. Holding till surgery, Disp: , Rfl:     silodosin (RAPAFLO) 4 mg capsule, Take 4 mg by mouth nightly., Disp: , Rfl:     sildenafil citrate (VIAGRA) 100 mg tablet, Take 100 mg by mouth as needed. , Disp: , Rfl:     cholecalciferol, vitamin D3, (VITAMIN D3) 2,000 unit tab, Take  by mouth.  Holding for surgery, Disp: , Rfl:    Date Last Reviewed:  03-22-17   Number of Personal Factors/Comorbidities that affect the Plan of Care: 1-2: MODERATE COMPLEXITY   EXAMINATION:   Observation/Orthostatic Postural Assessment: Forward head, rounded shoulders; L UE in sling  Palpation:          Generalized tenderness L shoulder  ROM: (PROM)    L shoulder flexion = 160 degrees  L shoulder abduction = 160 degrees  L shoulder external rotation = 80 degrees    Strength:      Not assessed    Special Tests:      Not assessed    Neurological Screen:        Sensation: Intact to light touch L UE  Functional Mobility:         Pt able to transition sit to supine and supine to sit independently    Body Structures Involved:  1. Bones  2. Joints  3. Muscles Body Functions Affected:  1. Sensory/Pain  2. Neuromusculoskeletal Activities and Participation Affected:  1. Mobility  2. Self Care  3. Domestic Life   Number of elements (examined above) that affect the Plan of Care: 3: MODERATE COMPLEXITY   CLINICAL PRESENTATION:   Presentation: Evolving clinical presentation with changing clinical characteristics: MODERATE COMPLEXITY   CLINICAL DECISION MAKING:   Outcome Measure: Tool Used: Disabilities of the Arm, Shoulder and Hand (DASH) Questionnaire - Quick Version  Score:  Initial: 45/55  Most Recent: X/55 (Date: -- )   Interpretation of Score: The DASH is designed to measure the activities of daily living in person's with upper extremity dysfunction or pain. Each section is scored on a 1-5 scale, 5 representing the greatest disability. The scores of each section are added together for a total score of 55. Medical Necessity:   · Patient is expected to demonstrate progress in strength, range of motion and functional technique to increase independence with self care, household and lawn activities. · Patient demonstrates good rehab potential due to higher previous functional level.   Reason for Services/Other Comments:  · Patient continues to require skilled intervention due to decreased ROM/strength L shoulder with increased pain leading to decreased functional status.    Use of outcome tool(s) and clinical judgement create a POC that gives a: Questionable prediction of patient's progress: Sandy Parker 35., PT

## 2022-06-27 NOTE — IP AVS SNAPSHOT
Current Discharge Medication List  
  
ASK your doctor about these medications Dose & Instructions Dispensing Information Comments Morning Noon Evening Bedtime  
 aspirin delayed-release 81 mg tablet Your last dose was: Your next dose is:    
   
   
 Dose:  81 mg Take 81 mg by mouth every morning. Refills:  0 Azelastine 0.15 % (205.5 mcg) nasal spray Commonly known as:  ASTEPRO Your last dose was: Your next dose is:    
   
   
 Dose:  2 Spray 2 Sprays by Both Nostrils route DIALYSIS PRN. Refills:  0  
     
   
   
   
  
 cyanocobalamin 1,000 mcg tablet Your last dose was: Your next dose is:    
   
   
 Dose:  1000 mcg Take 1,000 mcg by mouth daily. Refills:  0  
     
   
   
   
  
 finasteride 5 mg tablet Commonly known as:  PROSCAR Your last dose was: Your next dose is:    
   
   
 Dose:  5 mg Take 5 mg by mouth every morning. Refills:  0  
     
   
   
   
  
 FISH OIL 1,000 mg (120 mg-180 mg) Cap Generic drug:  Omega-3-DHA-EPA-Fish Oil Your last dose was: Your next dose is: Take  by mouth two (2) times a day. Holding for suregey Refills:  0 LIPITOR 40 mg tablet Generic drug:  atorvastatin Your last dose was: Your next dose is:    
   
   
 Dose:  40 mg Take 40 mg by mouth every morning. Refills:  0 MULTAQ Tab tablet Generic drug:  dronedarone Your last dose was: Your next dose is:    
   
   
 Dose:  400 mg Take 400 mg by mouth two (2) times daily (with meals). Indications: PAROXYSMAL ATRIAL FIBRILLATION Refills:  0  
     
   
   
   
  
 multivitamin tablet Commonly known as:  ONE A DAY Your last dose was: Your next dose is:    
   
   
 Dose:  1 Tab Take 1 Tab by mouth daily. Holding till surgery Refills:  0 RAPAFLO 4 mg capsule Generic drug:  silodosin Your last dose was: Your next dose is:    
   
   
 Dose:  4 mg Take 4 mg by mouth nightly. Refills:  0 SINGULAIR 10 mg tablet Generic drug:  montelukast  
   
Your last dose was: Your next dose is:    
   
   
 Dose:  10 mg Take 10 mg by mouth nightly. Refills:  0 VIAGRA 100 mg tablet Generic drug:  sildenafil citrate Your last dose was: Your next dose is:    
   
   
 Dose:  100 mg Take 100 mg by mouth as needed. Refills:  0  
     
   
   
   
  
 VITAMIN D3 2,000 unit Tab Generic drug:  cholecalciferol (vitamin D3) Your last dose was: Your next dose is: Take  by mouth. Holding for surgery Refills:  0 Quality 431: Preventive Care And Screening: Unhealthy Alcohol Use - Screening: Patient not identified as an unhealthy alcohol user when screened for unhealthy alcohol use using a systematic screening method Quality 130: Documentation Of Current Medications In The Medical Record: Current Medications Documented Detail Level: Detailed Quality 111:Pneumonia Vaccination Status For Older Adults: Pneumococcal vaccine administered on or after patient’s 60th birthday and before the end of the measurement period Quality 226: Preventive Care And Screening: Tobacco Use: Screening And Cessation Intervention: Patient screened for tobacco use and is an ex/non-smoker

## (undated) DEVICE — SUTURE MCRYL SZ 3-0 L27IN ABSRB UD L19MM PS-2 3/8 CIR PRIM Y427H

## (undated) DEVICE — TWINFIX 2 INCH SUTURE PASSER, STERILE: Brand: TWINFIX

## (undated) DEVICE — SYR 50ML LR LCK 1ML GRAD NSAF --

## (undated) DEVICE — ULTRATAPE SUTURE COBRAID BLUE, 6                                    PER BOX: Brand: ULTRATAPE

## (undated) DEVICE — SOFT SILICONE HYDROCELLULAR FOAM DRESSING WITH LOCK AWAY LAYER: Brand: ALLEVYN LIFE XL 21X21 CTN10

## (undated) DEVICE — CLEAR-TRAC 5.5 MM X 72 MM THREADED                                    CANNULA, WITH DISPOSABLE OBTURATOR,                                    BLUE, STERILE: Brand: CLEAR-TRAC

## (undated) DEVICE — 90-S ACCELERATOR, SUCTION PROBE, NON-BENDABLE, MAX CUT LEVEL 11: Brand: SERFAS ENERGY

## (undated) DEVICE — KENDALL SCD EXPRESS SLEEVES, KNEE LENGTH, MEDIUM: Brand: KENDALL SCD

## (undated) DEVICE — OUTFLOW CASSETTE TUBING, DO NOT USE IF PACKAGE IS DAMAGED: Brand: CROSSFLOW

## (undated) DEVICE — (D)STRIP SKN CLSR 0.5X4IN WHT --

## (undated) DEVICE — STERILE POLYISOPRENE POWDER-FREE SURGICAL GLOVES: Brand: PROTEXIS

## (undated) DEVICE — 3M™ IOBAN™ 2 ANTIMICROBIAL INCISE DRAPE 6650EZ: Brand: IOBAN™ 2

## (undated) DEVICE — PACK,SHOULDER,DRAPE,POUCH: Brand: MEDLINE

## (undated) DEVICE — BLADE SHV CUT MENIS AGG + 4MM --

## (undated) DEVICE — MASTISOL ADHESIVE LIQ 2/3ML

## (undated) DEVICE — STERILE LATEX POWDER FREE SURGICAL GLOVES WITH HYDROGEL COATING: Brand: PROTEXIS

## (undated) DEVICE — TRUEPASS DISPOSABLE NEEDLES 5 PER BOX: Brand: TRUEPASS

## (undated) DEVICE — 4-PORT MANIFOLD: Brand: NEPTUNE 2

## (undated) DEVICE — SOLUTION IRRIG 3000ML 0.9% SOD CHL FLX CONT 0797208] ICU MEDICAL INC]

## (undated) DEVICE — INFLOW CASSETTE TUBING, DO NOT USE IF PACKAGE IS DAMAGED: Brand: CROSSFLOW

## (undated) DEVICE — SURGICAL PROCEDURE PACK BASIC ST FRANCIS

## (undated) DEVICE — [AGGRESSIVE 6-FLUTE BARREL BUR, ARTHROSCOPIC SHAVER BLADE,  DO NOT RESTERILIZE,  DO NOT USE IF PACKAGE IS DAMAGED,  KEEP DRY,  KEEP AWAY FROM SUNLIGHT]: Brand: FORMULA

## (undated) DEVICE — NDL SPNE QNCKE 18GX3.5IN LF --

## (undated) DEVICE — Device

## (undated) DEVICE — (D)PREP SKN CHLRAPRP APPL 26ML -- CONVERT TO ITEM 371833

## (undated) DEVICE — INTENDED FOR TISSUE SEPARATION, AND OTHER PROCEDURES THAT REQUIRE A SHARP SURGICAL BLADE TO PUNCTURE OR CUT.: Brand: BARD-PARKER ® STAINLESS STEEL BLADES

## (undated) DEVICE — CLEAR-TRAC 7.0 MM X 72 MM THREADED                                    CANNULA, WITH DISPOSABLE OBTURATOR,                                    GREY, STERILE: Brand: CLEAR-TRAC